# Patient Record
Sex: MALE | Race: WHITE | NOT HISPANIC OR LATINO | Employment: OTHER | URBAN - METROPOLITAN AREA
[De-identification: names, ages, dates, MRNs, and addresses within clinical notes are randomized per-mention and may not be internally consistent; named-entity substitution may affect disease eponyms.]

---

## 2023-07-07 DIAGNOSIS — E79.0 HYPERURICEMIA: Primary | ICD-10-CM

## 2023-07-07 RX ORDER — OLMESARTAN MEDOXOMIL 40 MG/1
1 TABLET ORAL DAILY
COMMUNITY
Start: 2022-04-20 | End: 2023-11-01

## 2023-07-07 RX ORDER — ALLOPURINOL 100 MG/1
1 TABLET ORAL DAILY
COMMUNITY
Start: 2022-04-12 | End: 2023-07-07 | Stop reason: SDUPTHER

## 2023-07-07 RX ORDER — ALBUTEROL SULFATE 90 UG/1
AEROSOL, METERED RESPIRATORY (INHALATION)
COMMUNITY
Start: 2022-07-08 | End: 2023-07-31 | Stop reason: ALTCHOICE

## 2023-07-07 RX ORDER — NAPROXEN 500 MG/1
TABLET ORAL 2 TIMES DAILY
COMMUNITY
Start: 2022-01-30 | End: 2023-07-31 | Stop reason: ALTCHOICE

## 2023-07-07 RX ORDER — ALLOPURINOL 100 MG/1
100 TABLET ORAL DAILY
Qty: 90 TABLET | Refills: 1 | Status: SHIPPED | OUTPATIENT
Start: 2023-07-07 | End: 2024-01-10 | Stop reason: SDUPTHER

## 2023-07-07 RX ORDER — FUROSEMIDE 20 MG/1
1 TABLET ORAL DAILY
COMMUNITY
Start: 2022-07-12 | End: 2024-01-30 | Stop reason: ALTCHOICE

## 2023-07-07 RX ORDER — LOSARTAN POTASSIUM AND HYDROCHLOROTHIAZIDE 12.5; 1 MG/1; MG/1
1 TABLET ORAL DAILY
COMMUNITY
End: 2023-07-31 | Stop reason: ALTCHOICE

## 2023-07-07 RX ORDER — COLCHICINE 0.6 MG/1
TABLET ORAL
COMMUNITY
Start: 2016-07-21 | End: 2024-01-10 | Stop reason: SDUPTHER

## 2023-07-07 RX ORDER — AMOXICILLIN AND CLAVULANATE POTASSIUM 875; 125 MG/1; MG/1
1 TABLET, FILM COATED ORAL EVERY 12 HOURS
COMMUNITY
Start: 2023-05-24 | End: 2023-07-31 | Stop reason: ALTCHOICE

## 2023-07-07 RX ORDER — ASPIRIN 81 MG/1
1 TABLET ORAL DAILY
COMMUNITY
Start: 2018-04-24 | End: 2024-01-30 | Stop reason: ALTCHOICE

## 2023-07-12 LAB
ALANINE AMINOTRANSFERASE (SGPT) (U/L) IN SER/PLAS: 49 U/L (ref 10–52)
ALBUMIN (G/DL) IN SER/PLAS: 4.4 G/DL (ref 3.4–5)
ALKALINE PHOSPHATASE (U/L) IN SER/PLAS: 96 U/L (ref 33–136)
ASPARTATE AMINOTRANSFERASE (SGOT) (U/L) IN SER/PLAS: 32 U/L (ref 9–39)
BILIRUBIN DIRECT (MG/DL) IN SER/PLAS: 0.3 MG/DL (ref 0–0.3)
BILIRUBIN TOTAL (MG/DL) IN SER/PLAS: 2.1 MG/DL (ref 0–1.2)
CREATININE (MG/DL) IN SER/PLAS: 1.11 MG/DL (ref 0.5–1.3)
GFR MALE: 71 ML/MIN/1.73M2
PROTEIN TOTAL: 7.4 G/DL (ref 6.4–8.2)
URATE (MG/DL) IN SER/PLAS: 7.3 MG/DL (ref 4–7.5)

## 2023-07-31 ENCOUNTER — OFFICE VISIT (OUTPATIENT)
Dept: PRIMARY CARE | Facility: CLINIC | Age: 73
End: 2023-07-31
Payer: MEDICARE

## 2023-07-31 VITALS
DIASTOLIC BLOOD PRESSURE: 80 MMHG | HEIGHT: 70 IN | HEART RATE: 71 BPM | SYSTOLIC BLOOD PRESSURE: 130 MMHG | BODY MASS INDEX: 35.36 KG/M2 | TEMPERATURE: 97.3 F | OXYGEN SATURATION: 95 % | WEIGHT: 247 LBS

## 2023-07-31 DIAGNOSIS — I10 PRIMARY HYPERTENSION: ICD-10-CM

## 2023-07-31 DIAGNOSIS — I50.9 CHRONIC CONGESTIVE HEART FAILURE, UNSPECIFIED HEART FAILURE TYPE (MULTI): ICD-10-CM

## 2023-07-31 DIAGNOSIS — Z00.00 ROUTINE GENERAL MEDICAL EXAMINATION AT HEALTH CARE FACILITY: Primary | ICD-10-CM

## 2023-07-31 DIAGNOSIS — Z11.59 NEED FOR HEPATITIS C SCREENING TEST: ICD-10-CM

## 2023-07-31 DIAGNOSIS — I70.213 INTERMITTENT CLAUDICATION OF BOTH LOWER EXTREMITIES DUE TO ATHEROSCLEROSIS (CMS-HCC): ICD-10-CM

## 2023-07-31 DIAGNOSIS — E66.01 CLASS 2 SEVERE OBESITY DUE TO EXCESS CALORIES WITH SERIOUS COMORBIDITY AND BODY MASS INDEX (BMI) OF 35.0 TO 35.9 IN ADULT (MULTI): ICD-10-CM

## 2023-07-31 DIAGNOSIS — Z12.12 SCREENING FOR COLORECTAL CANCER: ICD-10-CM

## 2023-07-31 DIAGNOSIS — Z23 NEED FOR VACCINATION: ICD-10-CM

## 2023-07-31 DIAGNOSIS — Z12.5 SCREENING FOR PROSTATE CANCER: ICD-10-CM

## 2023-07-31 DIAGNOSIS — J61 PULMONARY ASBESTOSIS (MULTI): ICD-10-CM

## 2023-07-31 DIAGNOSIS — Z12.11 SCREENING FOR COLORECTAL CANCER: ICD-10-CM

## 2023-07-31 DIAGNOSIS — E78.2 MIXED HYPERLIPIDEMIA: ICD-10-CM

## 2023-07-31 DIAGNOSIS — L72.3 SEBACEOUS CYST: ICD-10-CM

## 2023-07-31 PROBLEM — R60.0 EDEMA OF BOTH FEET: Status: ACTIVE | Noted: 2023-07-31

## 2023-07-31 PROBLEM — S61.459A DOG BITE, HAND: Status: ACTIVE | Noted: 2023-07-31

## 2023-07-31 PROBLEM — Z95.1 H/O FOUR VESSEL CORONARY ARTERY BYPASS GRAFT: Status: ACTIVE | Noted: 2023-07-31

## 2023-07-31 PROBLEM — L03.114 CELLULITIS OF LEFT UPPER EXTREMITY: Status: ACTIVE | Noted: 2023-07-31

## 2023-07-31 PROBLEM — M10.9 GOUT: Status: ACTIVE | Noted: 2023-07-31

## 2023-07-31 PROBLEM — M71.121 INFECTION OF RIGHT OLECRANON BURSA: Status: ACTIVE | Noted: 2023-07-31

## 2023-07-31 PROBLEM — E66.812 CLASS 2 SEVERE OBESITY DUE TO EXCESS CALORIES WITH SERIOUS COMORBIDITY AND BODY MASS INDEX (BMI) OF 35.0 TO 35.9 IN ADULT: Status: ACTIVE | Noted: 2023-07-31

## 2023-07-31 PROBLEM — M54.50 ACUTE BILATERAL LOW BACK PAIN WITHOUT SCIATICA: Status: ACTIVE | Noted: 2023-07-31

## 2023-07-31 PROBLEM — I35.0 AORTIC STENOSIS: Status: ACTIVE | Noted: 2023-07-31

## 2023-07-31 PROBLEM — M1A.9XX1 CHRONIC TOPHACEOUS GOUT: Status: ACTIVE | Noted: 2023-07-31

## 2023-07-31 PROBLEM — E78.5 HYPERLIPIDEMIA: Status: ACTIVE | Noted: 2023-07-31
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
## 2023-07-31 PROBLEM — M1A.9XX0 CHRONIC GOUT: Status: ACTIVE | Noted: 2023-07-31

## 2023-07-31 PROBLEM — N52.9 ED (ERECTILE DYSFUNCTION): Status: ACTIVE | Noted: 2023-07-31

## 2023-07-31 PROBLEM — I25.10 CORONARY ARTERY DISEASE: Status: ACTIVE | Noted: 2023-07-31

## 2023-07-31 PROBLEM — M75.41 IMPINGEMENT SYNDROME OF RIGHT SHOULDER: Status: ACTIVE | Noted: 2023-07-31

## 2023-07-31 PROBLEM — R06.02 SHORTNESS OF BREATH: Status: ACTIVE | Noted: 2023-07-31

## 2023-07-31 PROBLEM — J06.9 UPPER RESPIRATORY INFECTION, ACUTE: Status: ACTIVE | Noted: 2023-07-31

## 2023-07-31 PROBLEM — Z95.2 S/P AVR: Status: ACTIVE | Noted: 2023-07-31

## 2023-07-31 PROBLEM — M71.122 SEPTIC OLECRANON BURSITIS OF LEFT ELBOW: Status: ACTIVE | Noted: 2023-07-31

## 2023-07-31 PROCEDURE — G0442 ANNUAL ALCOHOL SCREEN 15 MIN: HCPCS | Performed by: FAMILY MEDICINE

## 2023-07-31 PROCEDURE — G0446 INTENS BEHAVE THER CARDIO DX: HCPCS | Performed by: FAMILY MEDICINE

## 2023-07-31 PROCEDURE — 1170F FXNL STATUS ASSESSED: CPT | Performed by: FAMILY MEDICINE

## 2023-07-31 PROCEDURE — 1036F TOBACCO NON-USER: CPT | Performed by: FAMILY MEDICINE

## 2023-07-31 PROCEDURE — G0439 PPPS, SUBSEQ VISIT: HCPCS | Performed by: FAMILY MEDICINE

## 2023-07-31 PROCEDURE — 90677 PCV20 VACCINE IM: CPT | Performed by: FAMILY MEDICINE

## 2023-07-31 PROCEDURE — 99497 ADVNCD CARE PLAN 30 MIN: CPT | Performed by: FAMILY MEDICINE

## 2023-07-31 PROCEDURE — 1160F RVW MEDS BY RX/DR IN RCRD: CPT | Performed by: FAMILY MEDICINE

## 2023-07-31 PROCEDURE — 1157F ADVNC CARE PLAN IN RCRD: CPT | Performed by: FAMILY MEDICINE

## 2023-07-31 PROCEDURE — 99397 PER PM REEVAL EST PAT 65+ YR: CPT | Performed by: FAMILY MEDICINE

## 2023-07-31 PROCEDURE — 3075F SYST BP GE 130 - 139MM HG: CPT | Performed by: FAMILY MEDICINE

## 2023-07-31 PROCEDURE — G0009 ADMIN PNEUMOCOCCAL VACCINE: HCPCS | Performed by: FAMILY MEDICINE

## 2023-07-31 PROCEDURE — 3079F DIAST BP 80-89 MM HG: CPT | Performed by: FAMILY MEDICINE

## 2023-07-31 PROCEDURE — 1126F AMNT PAIN NOTED NONE PRSNT: CPT | Performed by: FAMILY MEDICINE

## 2023-07-31 PROCEDURE — 1159F MED LIST DOCD IN RCRD: CPT | Performed by: FAMILY MEDICINE

## 2023-07-31 PROCEDURE — 3008F BODY MASS INDEX DOCD: CPT | Performed by: FAMILY MEDICINE

## 2023-07-31 ASSESSMENT — LIFESTYLE VARIABLES
HOW OFTEN DO YOU HAVE A DRINK CONTAINING ALCOHOL: NEVER
HOW OFTEN DO YOU HAVE SIX OR MORE DRINKS ON ONE OCCASION: NEVER

## 2023-07-31 ASSESSMENT — PATIENT HEALTH QUESTIONNAIRE - PHQ9
SUM OF ALL RESPONSES TO PHQ9 QUESTIONS 1 & 2: 0
2. FEELING DOWN, DEPRESSED OR HOPELESS: NOT AT ALL
1. LITTLE INTEREST OR PLEASURE IN DOING THINGS: NOT AT ALL
2. FEELING DOWN, DEPRESSED OR HOPELESS: NOT AT ALL
1. LITTLE INTEREST OR PLEASURE IN DOING THINGS: NOT AT ALL
SUM OF ALL RESPONSES TO PHQ9 QUESTIONS 1 AND 2: 0

## 2023-07-31 ASSESSMENT — ENCOUNTER SYMPTOMS
OCCASIONAL FEELINGS OF UNSTEADINESS: 0
DEPRESSION: 0
LOSS OF SENSATION IN FEET: 0

## 2023-07-31 ASSESSMENT — ACTIVITIES OF DAILY LIVING (ADL)
DOING_HOUSEWORK: INDEPENDENT
GROCERY_SHOPPING: INDEPENDENT
MANAGING_FINANCES: INDEPENDENT
TAKING_MEDICATION: INDEPENDENT
BATHING: INDEPENDENT
DRESSING: INDEPENDENT

## 2023-07-31 ASSESSMENT — PAIN SCALES - GENERAL: PAINLEVEL: 0-NO PAIN

## 2023-07-31 NOTE — PROGRESS NOTES
Subjective   Reason for Visit: Mynor Blake is an 72 y.o. male here for a Medicare Wellness visit.     Past Medical, Surgical, and Family History reviewed and updated in chart.    Reviewed all medications by prescribing practitioner or clinical pharmacist (such as prescriptions, OTCs, herbal therapies and supplements) and documented in the medical record.    HPI  72-year-old male for Medicare wellness visit and complete physical exam.  Patient would like a plastic surgery referral for removal of a sebaceous cyst on his forehead.  He is active with cardiology.  He is due for colon cancer screening.  Patient Care Team:  Pancho FRANKS DO as PCP - General  Pancho FRANKS DO as PCP - [employer] PCP     Review of Systems  Constitutional: no chills, no fever and no night sweats.   Eyes: no blurred vision and no eyesight problems.   ENT: no hearing loss, no nasal congestion, no nasal discharge, no hoarseness and no sore throat.   Cardiovascular: no chest pain, no intermittent leg claudication, no lower extremity edema, no palpitations and no syncope.   Respiratory: no cough, no shortness of breath during exertion, no shortness of breath at rest and no wheezing.   Gastrointestinal: no abdominal pain, no blood in stools, no constipation, no diarrhea, no melena, no nausea, no rectal pain and no vomiting.   Genitourinary: no dysuria, no change in urinary frequency, no urinary hesitancy and no feelings of urinary urgency.   Neurological: no difficulty walking, no headache, no limb weakness, no numbness and no tingling.   Psychiatric: no anxiety, no depression, no anhedonia and no substance use disorders.   Endocrine: no recent weight gain and no recent weight loss.   Hematologic/Lymphatic: no tendency for easy bruising and no swollen glands.  Objective   Vitals:  /80 (BP Location: Left arm, Patient Position: Sitting, BP Cuff Size: Adult)   Pulse 71   Temp 36.3 °C (97.3 °F) (Temporal)   Ht  "1.778 m (5' 10\")   Wt 112 kg (247 lb)   SpO2 95%   BMI 35.44 kg/m²       Physical Exam  Constitutional: Alert and in no acute distress. Well developed, well nourished.   Eyes: Pupils were equal in size, round, reactive to light (PERRL) with normal accommodation and extraocular movements intact (EOMI). Ophthalmoscopic examination: Normal.   Ears, Nose, Mouth, and Throat: External inspection of ears and nose: Normal. Otoscopic examination: Normal. Lips, teeth, and gums: Normal. Oropharynx: Normal.   Neck: No neck mass was observed. Supple. Thyroid not enlarged and there were no palpable thyroid nodules.   Cardiovascular: Heart rate and rhythm were normal, normal S1 and S2, no gallops, no murmurs and no pericardial rub. Carotid pulses: Normal with no bruits. Abdominal aorta: Normal. Pedal pulses: Normal. No peripheral edema.   Pulmonary: No respiratory distress. Clear bilateral breath sounds.   Abdomen: Soft nontender; no abdominal mass palpated. Normal bowel sounds. No organomegaly.   Skin: Normal skin color and pigmentation, normal skin turgor, and no rash.   Psychiatric: Judgment and insight: Intact. Mood and affect: Normal.  Assessment/Plan   Problem List Items Addressed This Visit       Hyperlipidemia    Hypertension    Relevant Orders    Lipid Panel    Comprehensive Metabolic Panel    CBC and Auto Differential    Urinalysis with Reflex Microscopic    Intermittent claudication of both lower extremities due to atherosclerosis (CMS/HCC)    Pulmonary asbestosis (CMS/HCC)    Need for vaccination    Relevant Orders    Pneumococcal conjugate vaccine 20-valent IM (Completed)    Need for hepatitis C screening test    Relevant Orders    Hepatitis C antibody    Screening for colorectal cancer    Relevant Orders    Cologuard® colon cancer screening    Screening for prostate cancer    Relevant Orders    Prostate Specific Antigen, Screen    Routine general medical examination at health care facility - Primary    Class 2 " severe obesity due to excess calories with serious comorbidity and body mass index (BMI) of 35.0 to 35.9 in adult (CMS/[facility])    Sebaceous cyst    Relevant Orders    Referral to Plastic Surgery    Chronic congestive heart failure, unspecified heart failure type (CMS/[facility])     #1.  Stable physical exam.  2.  We will call fasting blood work results.  3.  We discussed class II severe obesity with a BMI above 35 and a serious comorbidity of hypertension.  We discussed exercise weight loss.  Calorie count for weight loss.  Recheck BMI in 6 months.  4.  I spent 15 minutes screening for alcohol use.  5.  I spent 15 minutes face-to-face with this individual discussing the cardiovascular risk and behavioral therapies of nutritional choices, exercise, and elimination of habits contributing to risk. We agreed on a plan how they may be able to reduce their current cardiovascular risk. Aspirin use was discussed and encouraged. Patient's 10 year cardiovascular risk estimate calculates at  6.  Congestive heart failure stable.  Continue current medications.  Follow-up in 6 months.  7.  Intermittent claudication is currently stable.  Not worsening.  Follow-up in 6 months.  8.  Pulmonary asbestosis is stable.  Monitor lung function.  Follow-up in 6 months.

## 2023-07-31 NOTE — ACP (ADVANCE CARE PLANNING)
Advance Care Planning Note     Discussion Date: 07/31/23   Discussion Participants: patient    The patient wishes to discuss Advance Care Planning today and the following is a brief summary of our discussion.     Patient has capacity to make their own medical decisions: Yes  Health Care Agent/Surrogate Decision Maker documented in chart: Yes    Documents on file and valid:  Advance Directive/Living Will: Yes   Health Care Power of : Yes  Other:     Communication of Medical Status/Prognosis:   Medical status is good.  Prognosis is good    Communication of Treatment Goals/Options:   Treatment goals discussed    Treatment Decisions  Goals of Care: quality of life is prioritized; willing to accept low-burden treatments to achieve meaningful goals     Follow Up Plan    Team Members    Time Statement: Total face to face time spent on advance care planning was 16 minutes with 16 minutes spent in counseling, including the explanation.    Pancho Gay DO  7/31/2023 11:35 AM

## 2023-08-02 ENCOUNTER — TELEPHONE (OUTPATIENT)
Dept: PRIMARY CARE | Facility: CLINIC | Age: 73
End: 2023-08-02
Payer: MEDICARE

## 2023-08-02 NOTE — TELEPHONE ENCOUNTER
Pt called and states that his [facility] Pt states that mana contacted him and said that he needs a prior authorization.

## 2023-10-31 DIAGNOSIS — I10 PRIMARY HYPERTENSION: Primary | ICD-10-CM

## 2023-10-31 DIAGNOSIS — M1A.9XX1 CHRONIC GOUT, UNSPECIFIED, WITH TOPHUS (TOPHI): ICD-10-CM

## 2023-10-31 RX ORDER — ALLOPURINOL 300 MG/1
300 TABLET ORAL DAILY
Qty: 90 TABLET | Refills: 0 | Status: SHIPPED | OUTPATIENT
Start: 2023-10-31 | End: 2024-04-04

## 2023-11-01 RX ORDER — OLMESARTAN MEDOXOMIL 40 MG/1
40 TABLET ORAL DAILY
Qty: 90 TABLET | Refills: 3 | Status: SHIPPED | OUTPATIENT
Start: 2023-11-01

## 2024-01-10 ENCOUNTER — OFFICE VISIT (OUTPATIENT)
Dept: RHEUMATOLOGY | Facility: CLINIC | Age: 74
End: 2024-01-10
Payer: MEDICARE

## 2024-01-10 VITALS — BODY MASS INDEX: 35.65 KG/M2 | WEIGHT: 249 LBS | HEIGHT: 70 IN

## 2024-01-10 DIAGNOSIS — E79.0 HYPERURICEMIA: ICD-10-CM

## 2024-01-10 DIAGNOSIS — M1A.9XX1 CHRONIC TOPHACEOUS GOUT: Primary | ICD-10-CM

## 2024-01-10 PROCEDURE — 1036F TOBACCO NON-USER: CPT | Performed by: INTERNAL MEDICINE

## 2024-01-10 PROCEDURE — 1160F RVW MEDS BY RX/DR IN RCRD: CPT | Performed by: INTERNAL MEDICINE

## 2024-01-10 PROCEDURE — 1158F ADVNC CARE PLAN TLK DOCD: CPT | Performed by: INTERNAL MEDICINE

## 2024-01-10 PROCEDURE — 1159F MED LIST DOCD IN RCRD: CPT | Performed by: INTERNAL MEDICINE

## 2024-01-10 PROCEDURE — 1126F AMNT PAIN NOTED NONE PRSNT: CPT | Performed by: INTERNAL MEDICINE

## 2024-01-10 PROCEDURE — 99213 OFFICE O/P EST LOW 20 MIN: CPT | Performed by: INTERNAL MEDICINE

## 2024-01-10 PROCEDURE — 3008F BODY MASS INDEX DOCD: CPT | Performed by: INTERNAL MEDICINE

## 2024-01-10 RX ORDER — LOSARTAN POTASSIUM 100 MG/1
100 TABLET ORAL
COMMUNITY

## 2024-01-10 RX ORDER — COLCHICINE 0.6 MG/1
0.6 TABLET ORAL EVERY OTHER DAY
Qty: 45 TABLET | Refills: 1 | Status: SHIPPED | OUTPATIENT
Start: 2024-01-10 | End: 2024-04-09

## 2024-01-10 RX ORDER — ALLOPURINOL 100 MG/1
200 TABLET ORAL DAILY
Qty: 180 TABLET | Refills: 1 | Status: SHIPPED | OUTPATIENT
Start: 2024-01-10 | End: 2024-04-09

## 2024-01-10 RX ORDER — ACETAMINOPHEN 500 MG
500-1000 TABLET ORAL EVERY 6 HOURS PRN
COMMUNITY
Start: 2022-02-06

## 2024-01-10 NOTE — PROGRESS NOTES
Follow-up Rheumatology Patient Visit    Chief Complaint:  Mynor Blake is a 73 y.o. male presenting today for Follow-up.    History of Presenting Problem:   72 y/o male with CAD s/p CABG Hx of Gout arthropathy on Allopurinol present for evaluation. 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 He does report a Hx of gout flares mainly in the elbows and occasionally in his feet. His last Gout flare was December 2021.  His PCP recently got rid of HCTZ and it seem to have helped the Gout controlled.   He denies any recent Gout attacks. He still pain in his right hand, and has a nodule on the mid MCP joint that is painful when he bumps it. Still with nodule in the left elbow     Problem List:   Patient Active Problem List   Diagnosis    Acute bilateral low back pain without sciatica    Aortic stenosis    Cellulitis of left upper extremity    Coronary artery disease    Dog bite, hand    ED (erectile dysfunction)    Edema of both feet    Chronic gout    Chronic tophaceous gout    Gout    H/O four vessel coronary artery bypass graft    S/P AVR    Hyperlipidemia    Hypertension    Impingement syndrome of right shoulder    Intermittent claudication of both lower extremities due to atherosclerosis (CMS/HCC)    Pulmonary asbestosis (CMS/HCC)    Infection of right olecranon bursa    Septic olecranon bursitis of left elbow    Shortness of breath    Upper respiratory infection, acute    Need for vaccination    Need for hepatitis C screening test    Screening for colorectal cancer    Screening for prostate cancer    Routine general medical examination at health care facility    Class 2 severe obesity due to excess calories with serious comorbidity and body mass index (BMI) of 35.0 to 35.9 in adult (CMS/HCC)    Sebaceous cyst    Chronic congestive heart failure, unspecified heart failure type (CMS/HCC)       Past Medical History:   Past Medical History:   Diagnosis Date     Atherosclerosis of native arteries of extremities with intermittent claudication, right leg (CMS/HCC)     Atherosclerosis of native arteries of extremities with intermittent claudication, right leg    Morbid (severe) obesity due to excess calories (CMS/[facility]) 12/15/2021    Class 2 severe obesity due to excess calories with serious comorbidity and body mass index (BMI) of 35.0 to 35.9 in adult    Nonrheumatic aortic (valve) stenosis     Severe aortic stenosis    Other hyperlipidemia     Other hyperlipidemia    Personal history of other diseases of the circulatory system     History of coronary artery disease    Personal history of other diseases of the circulatory system     History of claudication    Personal history of other diseases of the musculoskeletal system and connective tissue 03/26/2018    History of gout    Personal history of other specified conditions     History of shortness of breath    Presence of aortocoronary bypass graft     S/P CABG x 3    Pyothorax without fistula (CMS/[facility]) 07/11/2018    Abscess of chest       Surgical History:   Past Surgical History:   Procedure Laterality Date    CHOLECYSTECTOMY  03/26/2018    Cholecystectomy    CORONARY ARTERY BYPASS GRAFT  04/30/2018    CABG    OTHER SURGICAL HISTORY  04/12/2022    Cataract surgery    OTHER SURGICAL HISTORY  04/12/2022    Bypass        Allergies: No Known Allergies    Medications:   Current Outpatient Medications:     acetaminophen (Tylenol) 500 mg tablet, Take 1-2 tablets (500-1,000 mg) by mouth every 6 hours if needed., Disp: , Rfl:     allopurinol (Zyloprim) 300 mg tablet, TAKE 1 TABLET BY MOUTH EVERY DAY AS DIRECTED, Disp: 90 tablet, Rfl: 0    aspirin 81 mg EC tablet, Take 1 tablet (81 mg) by mouth once daily., Disp: , Rfl:     furosemide (Lasix) 20 mg tablet, Take 1 tablet (20 mg) by mouth once daily., Disp: , Rfl:     losartan (Cozaar) 100 mg tablet, Take 1 tablet (100 mg) by mouth once daily., Disp: , Rfl:     olmesartan (BENIcar) 40  "mg tablet, TAKE 1 TABLET BY MOUTH EVERY DAY, Disp: 90 tablet, Rfl: 3    rosuvastatin (Crestor) 40 mg tablet, TAKE 1 TABLET BY MOUTH EVERY DAY, Disp: 90 tablet, Rfl: 3    allopurinol (Zyloprim) 100 mg tablet, Take 2 tablets (200 mg) by mouth once daily., Disp: 180 tablet, Rfl: 1    colchicine 0.6 mg tablet, Take 1 tablet (0.6 mg) by mouth every other day., Disp: 45 tablet, Rfl: 1      Objective   Physical Examination:    Visit Vitals  Ht 1.778 m (5' 10\")   Wt 113 kg (249 lb)   BMI 35.73 kg/m²   Smoking Status Never   BSA 2.36 m²        Gen: NAD, A&O x 3  HEENT: clear sclera and conjunctiva,     Musculoskeletal:   Neck; WNL, full ROM  Shoulder: WNL, full ROM  Elbow:WNL, full ROM, no effusion noted  Wrist and fingers;no active synovitis noted, Full ROM in the Wrist , Good fist and   Knees:  No effusions or crepitation, full ROM.  Hips; WNL, full ROM, Negative Juaquin test  Ankle, Feet; WNL, full ROM    Skin: No rashes or lesions seen, no nail changes  Neuro: A&O x3, Normal Gait    Procedures :None    Orders:  Orders Placed This Encounter   Procedures    Uric Acid    Comprehensive Metabolic Panel        Provider Impression:   Assessment/Plan   Encounter Diagnoses   Name Primary?    Chronic tophaceous gout Yes    Hyperuricemia         72 y/o male with CAD s/p CABG Hx of Gout arthropathy on Allopurinol present for evaluation. 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 He has not reported an new gout attack since regimen adjustment  -Will increase  allopurinol to 500 mg given uric acid is not at goal. Did discuss Krystexxa as an option if he continues to have frequent flares but patient not interested at this time.  We also discussed possible joint injection but patient is reluctant to do this as well.  -Continue colchicine every other day  -Check uric acid, hepatic panel and creatinine   -Follow-up in 6 months   "

## 2024-01-28 ENCOUNTER — HOSPITAL ENCOUNTER (EMERGENCY)
Facility: HOSPITAL | Age: 74
Discharge: HOME | End: 2024-01-28
Payer: MEDICARE

## 2024-01-28 ENCOUNTER — APPOINTMENT (OUTPATIENT)
Dept: RADIOLOGY | Facility: HOSPITAL | Age: 74
End: 2024-01-28
Payer: MEDICARE

## 2024-01-28 VITALS
HEIGHT: 68 IN | SYSTOLIC BLOOD PRESSURE: 161 MMHG | WEIGHT: 230 LBS | HEART RATE: 78 BPM | TEMPERATURE: 97.9 F | RESPIRATION RATE: 18 BRPM | BODY MASS INDEX: 34.86 KG/M2 | OXYGEN SATURATION: 98 % | DIASTOLIC BLOOD PRESSURE: 74 MMHG

## 2024-01-28 DIAGNOSIS — M54.50 ACUTE RIGHT-SIDED LOW BACK PAIN WITHOUT SCIATICA: Primary | ICD-10-CM

## 2024-01-28 PROCEDURE — 2500000004 HC RX 250 GENERAL PHARMACY W/ HCPCS (ALT 636 FOR OP/ED): Performed by: NURSE PRACTITIONER

## 2024-01-28 PROCEDURE — 72100 X-RAY EXAM L-S SPINE 2/3 VWS: CPT | Mod: FOREIGN READ | Performed by: RADIOLOGY

## 2024-01-28 PROCEDURE — 2500000001 HC RX 250 WO HCPCS SELF ADMINISTERED DRUGS (ALT 637 FOR MEDICARE OP): Performed by: NURSE PRACTITIONER

## 2024-01-28 PROCEDURE — 99284 EMERGENCY DEPT VISIT MOD MDM: CPT

## 2024-01-28 PROCEDURE — 99283 EMERGENCY DEPT VISIT LOW MDM: CPT

## 2024-01-28 PROCEDURE — 96372 THER/PROPH/DIAG INJ SC/IM: CPT

## 2024-01-28 PROCEDURE — 72100 X-RAY EXAM L-S SPINE 2/3 VWS: CPT

## 2024-01-28 RX ORDER — OXYCODONE AND ACETAMINOPHEN 5; 325 MG/1; MG/1
1 TABLET ORAL ONCE
Status: COMPLETED | OUTPATIENT
Start: 2024-01-28 | End: 2024-01-28

## 2024-01-28 RX ORDER — TIZANIDINE 4 MG/1
4 TABLET ORAL EVERY 6 HOURS PRN
Qty: 20 TABLET | Refills: 0 | Status: SHIPPED | OUTPATIENT
Start: 2024-01-28 | End: 2024-02-02

## 2024-01-28 RX ORDER — KETOROLAC TROMETHAMINE 30 MG/ML
15 INJECTION, SOLUTION INTRAMUSCULAR; INTRAVENOUS ONCE
Status: COMPLETED | OUTPATIENT
Start: 2024-01-28 | End: 2024-01-28

## 2024-01-28 RX ORDER — DICLOFENAC SODIUM 75 MG/1
75 TABLET, DELAYED RELEASE ORAL 2 TIMES DAILY PRN
Qty: 10 TABLET | Refills: 0 | Status: SHIPPED | OUTPATIENT
Start: 2024-01-28 | End: 2024-02-02

## 2024-01-28 RX ADMIN — KETOROLAC TROMETHAMINE 15 MG: 30 INJECTION, SOLUTION INTRAMUSCULAR at 17:36

## 2024-01-28 RX ADMIN — OXYCODONE HYDROCHLORIDE AND ACETAMINOPHEN 1 TABLET: 5; 325 TABLET ORAL at 17:36

## 2024-01-28 ASSESSMENT — COLUMBIA-SUICIDE SEVERITY RATING SCALE - C-SSRS
2. HAVE YOU ACTUALLY HAD ANY THOUGHTS OF KILLING YOURSELF?: NO
6. HAVE YOU EVER DONE ANYTHING, STARTED TO DO ANYTHING, OR PREPARED TO DO ANYTHING TO END YOUR LIFE?: NO
1. IN THE PAST MONTH, HAVE YOU WISHED YOU WERE DEAD OR WISHED YOU COULD GO TO SLEEP AND NOT WAKE UP?: NO

## 2024-01-28 ASSESSMENT — PAIN SCALES - GENERAL: PAINLEVEL_OUTOF10: 5 - MODERATE PAIN

## 2024-01-28 ASSESSMENT — LIFESTYLE VARIABLES
EVER HAD A DRINK FIRST THING IN THE MORNING TO STEADY YOUR NERVES TO GET RID OF A HANGOVER: NO
EVER FELT BAD OR GUILTY ABOUT YOUR DRINKING: NO
REASON UNABLE TO ASSESS: NO
HAVE PEOPLE ANNOYED YOU BY CRITICIZING YOUR DRINKING: NO
HAVE YOU EVER FELT YOU SHOULD CUT DOWN ON YOUR DRINKING: NO

## 2024-01-28 ASSESSMENT — PAIN DESCRIPTION - PROGRESSION: CLINICAL_PROGRESSION: GRADUALLY IMPROVING

## 2024-01-28 ASSESSMENT — PAIN - FUNCTIONAL ASSESSMENT: PAIN_FUNCTIONAL_ASSESSMENT: 0-10

## 2024-01-28 NOTE — ED TRIAGE NOTES
Pt states he has lower back pain that started last Thursday. Pt unsure if he injured it but was hunched over doing yard clean up previous to this. Pt states it hasn't gotten worse and medications have not helped. Pt has tried lidocaine patches and  tramadol.

## 2024-01-28 NOTE — ED PROVIDER NOTES
HPI   Chief Complaint   Patient presents with    Back Pain     Pt states he has lower back pain that started last Thursday. Pt unsure if he injured it but was hunched over doing yard clean up previous to this. Pt states it hasn't gotten worse and medications have not helped. Pt has tried lidocaine patches and  tramadol.       Patient is a 73-year-old male with past medical history of asbestosis, Intermittent claudication of the right leg, morbid obesity, CABG x 3, gout, hyperlipidemia, aortic stenosis, who presents ED today due to low back pain.  Patient states that started on Thursday and has been trying OTC medications and tramadol without relief.  Patient states it started after he did a round of cleaning up his yard after the snow melted.  Patient denies any fever, or unexplained weight loss, any bowel or bladder dysfunction, cancer, lower extremity weakness or numbness, saddle anesthesia, inability to ambulate, history of diabetes, history of IV drug use, or autoimmune suppression.                                    Sara Coma Scale Score: 15                  Patient History   Past Medical History:   Diagnosis Date    Atherosclerosis of native arteries of extremities with intermittent claudication, right leg (CMS/HCC)     Atherosclerosis of native arteries of extremities with intermittent claudication, right leg    Morbid (severe) obesity due to excess calories (CMS/HCC) 12/15/2021    Class 2 severe obesity due to excess calories with serious comorbidity and body mass index (BMI) of 35.0 to 35.9 in adult    Nonrheumatic aortic (valve) stenosis     Severe aortic stenosis    Other hyperlipidemia     Other hyperlipidemia    Personal history of other diseases of the circulatory system     History of coronary artery disease    Personal history of other diseases of the circulatory system     History of claudication    Personal history of other diseases of the musculoskeletal system and connective tissue 03/26/2018     History of gout    Personal history of other specified conditions     History of shortness of breath    Presence of aortocoronary bypass graft     S/P CABG x 3    Pyothorax without fistula (CMS/HCC) 07/11/2018    Abscess of chest     Past Surgical History:   Procedure Laterality Date    CHOLECYSTECTOMY  03/26/2018    Cholecystectomy    CORONARY ARTERY BYPASS GRAFT  04/30/2018    CABG    OTHER SURGICAL HISTORY  04/12/2022    Cataract surgery    OTHER SURGICAL HISTORY  04/12/2022    Bypass     Family History   Problem Relation Name Age of Onset    Heart defect Mother      Diabetes Father       Social History     Tobacco Use    Smoking status: Never    Smokeless tobacco: Never   Substance Use Topics    Alcohol use: Never    Drug use: Never       Physical Exam   ED Triage Vitals [01/28/24 1716]   Temperature Heart Rate Respirations BP   36.6 °C (97.9 °F) 81 (!) 22 168/87      Pulse Ox Temp src Heart Rate Source Patient Position   96 % -- -- --      BP Location FiO2 (%)     -- --       Physical Exam  Vitals and nursing note reviewed.   Constitutional:       General: He is not in acute distress.     Appearance: He is well-developed.   HENT:      Head: Normocephalic and atraumatic.   Eyes:      Conjunctiva/sclera: Conjunctivae normal.   Cardiovascular:      Rate and Rhythm: Normal rate and regular rhythm.      Heart sounds: No murmur heard.  Pulmonary:      Effort: Pulmonary effort is normal. No respiratory distress.      Breath sounds: Normal breath sounds.   Abdominal:      Palpations: Abdomen is soft.      Tenderness: There is no abdominal tenderness.   Musculoskeletal:         General: No swelling.      Cervical back: Neck supple.      Thoracic back: Normal.      Lumbar back: Tenderness (Right paraspinal plain of the lower lumbar region.) present. No spasms. Negative right straight leg raise test and negative left straight leg raise test.      Right foot: Normal capillary refill. Normal pulse.      Left foot: Normal  capillary refill. Normal pulse.      Comments: Lumbar pain:  L2 adduct thigh, normal  L3 extend knee, normal  L4 dorsiflex ankle, normal  L5 point great toe, normal  L2-L3-L4 knee reflex, 2+  S1 flex knee, normal  S2 plantarflex toes, normal  S3-S4-S5 groin, perianal sensation, Normal Per patient   Skin:     General: Skin is warm and dry.      Capillary Refill: Capillary refill takes less than 2 seconds.   Neurological:      Mental Status: He is alert.   Psychiatric:         Mood and Affect: Mood normal.         ED Course & MDM   ED Course as of 01/28/24 2018   Sun Jan 28, 2024 2016 XR lumbar spine 2-3 views  Mild anterolisthesis L5 on S1 possible, pars defect.  Moderate lower lumbar facet arthrosis.   [WS]      ED Course User Index  [WS] CARLYN Johnson-CNP         Diagnoses as of 01/28/24 2018   Acute right-sided low back pain without sciatica       Medical Decision Making  Differentials considered, cauda equina, epidural abscess, cord compression, musculoskeletal pain, kidney stones and pyelonephritis.  Given patient's history, physical exam, and risk factors as well as having no neurologic symptoms, weakness,   Bowel or bladder retention/incontinence it is unlikely that the cause of patient's complaints are related to cauda equina, cord compression, or epidural abscess.  Patient has no urinary symptoms concerning for kidney stone or pyelonephritis.  Patient was given IM Toradol and oral Percocet with moderate relief of pain.  Patient's respiratory rate listed as 22 on triage, on my count he is only at 14.  Patient's x-ray imaging does not reveal any acute fracture or subluxation, does have significant degenerative changes especially in L5/S1 region.    I discussed the differential, results and discharge plan with the patient.  I emphasized the importance of follow-up with the physician I referred them to in the timeframe recommended.  I explained reasons for the them to return to the Emergency  Department. Additional verbal discharge instructions were also given and discussed with them to supplement those generated by the EMR. We also discussed medications that were prescribed (if any) including common side effects and interactions. All questions were addressed.  They understand return precautions and discharge instructions. They expressed understanding.      Amount and/or Complexity of Data Reviewed  Radiology: ordered and independent interpretation performed.    Risk  Prescription drug management.        Procedure  Procedures     CARLYN Johnson-BECCA  01/28/24 2023

## 2024-01-30 ENCOUNTER — OFFICE VISIT (OUTPATIENT)
Dept: PRIMARY CARE | Facility: CLINIC | Age: 74
End: 2024-01-30
Payer: MEDICARE

## 2024-01-30 VITALS
TEMPERATURE: 96.6 F | BODY MASS INDEX: 38.62 KG/M2 | WEIGHT: 254.8 LBS | DIASTOLIC BLOOD PRESSURE: 82 MMHG | HEART RATE: 73 BPM | OXYGEN SATURATION: 98 % | HEIGHT: 68 IN | SYSTOLIC BLOOD PRESSURE: 120 MMHG

## 2024-01-30 DIAGNOSIS — I10 PRIMARY HYPERTENSION: ICD-10-CM

## 2024-01-30 DIAGNOSIS — M54.41 ACUTE RIGHT-SIDED LOW BACK PAIN WITH RIGHT-SIDED SCIATICA: Primary | ICD-10-CM

## 2024-01-30 PROCEDURE — 1157F ADVNC CARE PLAN IN RCRD: CPT | Performed by: FAMILY MEDICINE

## 2024-01-30 PROCEDURE — 3079F DIAST BP 80-89 MM HG: CPT | Performed by: FAMILY MEDICINE

## 2024-01-30 PROCEDURE — 3074F SYST BP LT 130 MM HG: CPT | Performed by: FAMILY MEDICINE

## 2024-01-30 PROCEDURE — 1036F TOBACCO NON-USER: CPT | Performed by: FAMILY MEDICINE

## 2024-01-30 PROCEDURE — 1125F AMNT PAIN NOTED PAIN PRSNT: CPT | Performed by: FAMILY MEDICINE

## 2024-01-30 PROCEDURE — 99214 OFFICE O/P EST MOD 30 MIN: CPT | Performed by: FAMILY MEDICINE

## 2024-01-30 PROCEDURE — 3008F BODY MASS INDEX DOCD: CPT | Performed by: FAMILY MEDICINE

## 2024-01-30 PROCEDURE — 1159F MED LIST DOCD IN RCRD: CPT | Performed by: FAMILY MEDICINE

## 2024-01-30 RX ORDER — METHYLPREDNISOLONE 4 MG/1
TABLET ORAL
Qty: 21 TABLET | Refills: 0 | Status: SHIPPED | OUTPATIENT
Start: 2024-01-30 | End: 2024-02-06

## 2024-01-30 ASSESSMENT — PAIN SCALES - GENERAL: PAINLEVEL: 4

## 2024-01-30 ASSESSMENT — ENCOUNTER SYMPTOMS
MYALGIAS: 1
BACK PAIN: 1

## 2024-01-30 NOTE — PROGRESS NOTES
Subjective   Patient ID: Mynor Blake is a 73 y.o. male who presents for Back Pain (Patient is here for back pain).  HPI  73-year-old male with a history of hypertension presents for urgent care follow-up for acute right-sided low back pain with radiation down the right leg.  Still having pain and discomfort.  No weakness.  No loss of bowel or bladder function.  Symptoms are constant.  Symptoms are getting worse.  Home blood pressures been stable since.  Review of Systems   Musculoskeletal:  Positive for back pain and myalgias.   Skin:  Negative for rash.   All other systems reviewed and are negative.      Visit Vitals  /82 (BP Location: Right arm, Patient Position: Sitting, BP Cuff Size: Adult long)   Pulse 73   Temp 35.9 °C (96.6 °F) (Temporal)        Objective   Physical Exam  Vitals reviewed.   Constitutional:       Appearance: Normal appearance.   Cardiovascular:      Rate and Rhythm: Normal rate and regular rhythm.      Heart sounds: Normal heart sounds.   Pulmonary:      Breath sounds: Normal breath sounds.   Musculoskeletal:         General: Tenderness present. No swelling or signs of injury.   Skin:     Findings: No rash.   Neurological:      Mental Status: He is alert.      Sensory: No sensory deficit.      Motor: No weakness.      Coordination: Coordination normal.      Gait: Gait normal.      Deep Tendon Reflexes: Reflexes normal.   Psychiatric:         Mood and Affect: Mood normal.         Behavior: Behavior normal.         Assessment/Plan   Problem List Items Addressed This Visit             ICD-10-CM    Hypertension I10    Acute right-sided low back pain with right-sided sciatica - Primary M54.41    Relevant Medications    methylPREDNISolone (Medrol Dospak) 4 mg tablets     #1.  Alternate ice and heat.  Medrol Dosepak.  Muscle laxer at bedtime.  Follow-up if no improvement in 7 to 10 days.  2.  Blood pressure stable.

## 2024-04-03 DIAGNOSIS — M1A.9XX1 CHRONIC GOUT, UNSPECIFIED, WITH TOPHUS (TOPHI): ICD-10-CM

## 2024-04-04 RX ORDER — ALLOPURINOL 300 MG/1
300 TABLET ORAL DAILY
Qty: 90 TABLET | Refills: 0 | Status: SHIPPED | OUTPATIENT
Start: 2024-04-04

## 2024-06-20 PROBLEM — Z96.1 S/P CATARACT EXTRACTION AND INSERTION OF INTRAOCULAR LENS: Status: ACTIVE | Noted: 2017-03-09

## 2024-06-20 PROBLEM — H35.419 LATTICE DEGENERATION OF RETINA: Status: ACTIVE | Noted: 2017-02-21
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
## 2024-06-20 PROBLEM — H02.833 DERMATOCHALASIS OF BOTH EYELIDS: Status: ACTIVE | Noted: 2017-02-21

## 2024-06-20 PROBLEM — N17.9 AKI (ACUTE KIDNEY INJURY) (CMS-HCC): Status: ACTIVE | Noted: 2021-04-25

## 2024-06-20 PROBLEM — M75.40 IMPINGEMENT SYNDROME OF SHOULDER REGION: Status: ACTIVE | Noted: 2023-01-12

## 2024-06-20 PROBLEM — M25.529 ELBOW PAIN: Status: ACTIVE | Noted: 2021-04-25

## 2024-06-20 PROBLEM — H25.813 COMBINED FORMS OF AGE-RELATED CATARACT OF BOTH EYES: Status: ACTIVE | Noted: 2017-02-21

## 2024-06-20 PROBLEM — H02.836 DERMATOCHALASIS OF BOTH EYELIDS: Status: ACTIVE | Noted: 2017-02-21

## 2024-06-20 PROBLEM — R50.9 FEVER: Status: ACTIVE | Noted: 2021-04-25

## 2024-06-20 PROBLEM — M10.9 ACUTE GOUT OF RIGHT ANKLE: Status: ACTIVE | Noted: 2024-06-20

## 2024-06-20 PROBLEM — Z98.890 HISTORY OF REPAIR OF RETINAL TEAR BY LASER PHOTOCOAGULATION: Status: ACTIVE | Noted: 2017-02-21

## 2024-06-20 PROBLEM — H52.13 MYOPIA WITH PRESBYOPIA OF BOTH EYES: Status: ACTIVE | Noted: 2017-02-21

## 2024-06-20 PROBLEM — H52.4 MYOPIA WITH PRESBYOPIA OF BOTH EYES: Status: ACTIVE | Noted: 2017-02-21

## 2024-06-20 PROBLEM — Z98.49 S/P CATARACT EXTRACTION AND INSERTION OF INTRAOCULAR LENS: Status: ACTIVE | Noted: 2017-03-09

## 2024-06-20 PROBLEM — H52.209 ASTIGMATISM: Status: ACTIVE | Noted: 2017-02-21

## 2024-06-20 PROBLEM — Z86.79 HISTORY OF CLAUDICATION: Status: ACTIVE | Noted: 2024-06-20

## 2024-06-20 PROBLEM — H43.811 PVD (POSTERIOR VITREOUS DETACHMENT), RIGHT EYE: Status: ACTIVE | Noted: 2017-02-21

## 2024-06-20 PROBLEM — M1A.09X0 CHRONIC GOUT OF MULTIPLE SITES: Status: ACTIVE | Noted: 2017-03-03

## 2024-06-30 DIAGNOSIS — M1A.9XX1 CHRONIC GOUT, UNSPECIFIED, WITH TOPHUS (TOPHI): ICD-10-CM

## 2024-07-01 DIAGNOSIS — M1A.9XX1 CHRONIC GOUT, UNSPECIFIED, WITH TOPHUS (TOPHI): ICD-10-CM

## 2024-07-01 RX ORDER — ALLOPURINOL 100 MG/1
100 TABLET ORAL DAILY
Qty: 90 TABLET | Refills: 0 | Status: SHIPPED | OUTPATIENT
Start: 2024-07-01 | End: 2024-12-28

## 2024-07-01 RX ORDER — ALLOPURINOL 300 MG/1
300 TABLET ORAL DAILY
Qty: 90 TABLET | Refills: 0 | Status: SHIPPED | OUTPATIENT
Start: 2024-07-01

## 2024-07-01 RX ORDER — ALLOPURINOL 300 MG/1
300 TABLET ORAL DAILY
Qty: 90 TABLET | Refills: 0 | OUTPATIENT
Start: 2024-07-01

## 2024-07-10 ENCOUNTER — APPOINTMENT (OUTPATIENT)
Dept: RHEUMATOLOGY | Facility: CLINIC | Age: 74
End: 2024-07-10
Payer: MEDICARE

## 2024-07-25 PROBLEM — Z86.79 HISTORY OF CLAUDICATION: Status: RESOLVED | Noted: 2024-06-20 | Resolved: 2024-07-25

## 2024-08-01 ENCOUNTER — APPOINTMENT (OUTPATIENT)
Dept: PRIMARY CARE | Facility: CLINIC | Age: 74
End: 2024-08-01
Payer: MEDICARE

## 2024-08-01 ENCOUNTER — HOSPITAL ENCOUNTER (OUTPATIENT)
Dept: RADIOLOGY | Facility: CLINIC | Age: 74
Discharge: HOME | End: 2024-08-01
Payer: MEDICARE

## 2024-08-01 VITALS
HEART RATE: 85 BPM | HEIGHT: 68 IN | DIASTOLIC BLOOD PRESSURE: 86 MMHG | TEMPERATURE: 96 F | WEIGHT: 264.8 LBS | SYSTOLIC BLOOD PRESSURE: 140 MMHG | BODY MASS INDEX: 40.13 KG/M2 | OXYGEN SATURATION: 97 %

## 2024-08-01 DIAGNOSIS — S89.92XA LEFT KNEE INJURY, INITIAL ENCOUNTER: ICD-10-CM

## 2024-08-01 DIAGNOSIS — N17.9 AKI (ACUTE KIDNEY INJURY) (CMS-HCC): ICD-10-CM

## 2024-08-01 DIAGNOSIS — Z12.11 SCREENING FOR COLORECTAL CANCER: ICD-10-CM

## 2024-08-01 DIAGNOSIS — Z00.00 ROUTINE GENERAL MEDICAL EXAMINATION AT HEALTH CARE FACILITY: Primary | ICD-10-CM

## 2024-08-01 DIAGNOSIS — I50.9 CHRONIC CONGESTIVE HEART FAILURE, UNSPECIFIED HEART FAILURE TYPE (MULTI): ICD-10-CM

## 2024-08-01 DIAGNOSIS — E66.01 CLASS 2 SEVERE OBESITY DUE TO EXCESS CALORIES WITH SERIOUS COMORBIDITY AND BODY MASS INDEX (BMI) OF 35.0 TO 35.9 IN ADULT (MULTI): ICD-10-CM

## 2024-08-01 DIAGNOSIS — I70.213 INTERMITTENT CLAUDICATION OF BOTH LOWER EXTREMITIES DUE TO ATHEROSCLEROSIS (CMS-HCC): ICD-10-CM

## 2024-08-01 DIAGNOSIS — Z12.5 SCREENING FOR PROSTATE CANCER: ICD-10-CM

## 2024-08-01 DIAGNOSIS — J61 PULMONARY ASBESTOSIS (MULTI): ICD-10-CM

## 2024-08-01 DIAGNOSIS — Z12.12 SCREENING FOR COLORECTAL CANCER: ICD-10-CM

## 2024-08-01 DIAGNOSIS — E78.2 MIXED HYPERLIPIDEMIA: ICD-10-CM

## 2024-08-01 DIAGNOSIS — I10 PRIMARY HYPERTENSION: ICD-10-CM

## 2024-08-01 LAB
ALBUMIN SERPL BCP-MCNC: 4 G/DL (ref 3.4–5)
ALP SERPL-CCNC: 94 U/L (ref 33–136)
ALT SERPL W P-5'-P-CCNC: 51 U/L (ref 10–52)
ANION GAP SERPL CALC-SCNC: 12 MMOL/L (ref 10–20)
APPEARANCE UR: CLEAR
AST SERPL W P-5'-P-CCNC: 43 U/L (ref 9–39)
BASOPHILS # BLD AUTO: 0.06 X10*3/UL (ref 0–0.1)
BASOPHILS NFR BLD AUTO: 1 %
BILIRUB SERPL-MCNC: 0.7 MG/DL (ref 0–1.2)
BILIRUB UR STRIP.AUTO-MCNC: NEGATIVE MG/DL
BUN SERPL-MCNC: 18 MG/DL (ref 6–23)
CALCIUM SERPL-MCNC: 9.3 MG/DL (ref 8.6–10.6)
CHLORIDE SERPL-SCNC: 108 MMOL/L (ref 98–107)
CHOLEST SERPL-MCNC: 112 MG/DL (ref 0–199)
CHOLESTEROL/HDL RATIO: 3
CO2 SERPL-SCNC: 26 MMOL/L (ref 21–32)
COLOR UR: ABNORMAL
CREAT SERPL-MCNC: 1.07 MG/DL (ref 0.5–1.3)
EGFRCR SERPLBLD CKD-EPI 2021: 74 ML/MIN/1.73M*2
EOSINOPHIL # BLD AUTO: 0.57 X10*3/UL (ref 0–0.4)
EOSINOPHIL NFR BLD AUTO: 9.4 %
ERYTHROCYTE [DISTWIDTH] IN BLOOD BY AUTOMATED COUNT: 14.3 % (ref 11.5–14.5)
GLUCOSE SERPL-MCNC: 115 MG/DL (ref 74–99)
GLUCOSE UR STRIP.AUTO-MCNC: NORMAL MG/DL
HCT VFR BLD AUTO: 49.4 % (ref 41–52)
HDLC SERPL-MCNC: 37.7 MG/DL
HGB BLD-MCNC: 15.9 G/DL (ref 13.5–17.5)
IMM GRANULOCYTES # BLD AUTO: 0.02 X10*3/UL (ref 0–0.5)
IMM GRANULOCYTES NFR BLD AUTO: 0.3 % (ref 0–0.9)
KETONES UR STRIP.AUTO-MCNC: NEGATIVE MG/DL
LDLC SERPL CALC-MCNC: 48 MG/DL
LEUKOCYTE ESTERASE UR QL STRIP.AUTO: ABNORMAL
LYMPHOCYTES # BLD AUTO: 1.93 X10*3/UL (ref 0.8–3)
LYMPHOCYTES NFR BLD AUTO: 31.7 %
MCH RBC QN AUTO: 30.9 PG (ref 26–34)
MCHC RBC AUTO-ENTMCNC: 32.2 G/DL (ref 32–36)
MCV RBC AUTO: 96 FL (ref 80–100)
MONOCYTES # BLD AUTO: 0.36 X10*3/UL (ref 0.05–0.8)
MONOCYTES NFR BLD AUTO: 5.9 %
MUCOUS THREADS #/AREA URNS AUTO: NORMAL /LPF
NEUTROPHILS # BLD AUTO: 3.14 X10*3/UL (ref 1.6–5.5)
NEUTROPHILS NFR BLD AUTO: 51.7 %
NITRITE UR QL STRIP.AUTO: NEGATIVE
NON HDL CHOLESTEROL: 74 MG/DL (ref 0–149)
NRBC BLD-RTO: 0 /100 WBCS (ref 0–0)
PH UR STRIP.AUTO: 5.5 [PH]
PLATELET # BLD AUTO: 213 X10*3/UL (ref 150–450)
POTASSIUM SERPL-SCNC: 4.8 MMOL/L (ref 3.5–5.3)
PROT SERPL-MCNC: 7.2 G/DL (ref 6.4–8.2)
PROT UR STRIP.AUTO-MCNC: ABNORMAL MG/DL
PSA SERPL-MCNC: 0.48 NG/ML
RBC # BLD AUTO: 5.15 X10*6/UL (ref 4.5–5.9)
RBC # UR STRIP.AUTO: NEGATIVE /UL
RBC #/AREA URNS AUTO: NORMAL /HPF
SODIUM SERPL-SCNC: 141 MMOL/L (ref 136–145)
SP GR UR STRIP.AUTO: 1.02
TRIGL SERPL-MCNC: 134 MG/DL (ref 0–149)
UROBILINOGEN UR STRIP.AUTO-MCNC: NORMAL MG/DL
VLDL: 27 MG/DL (ref 0–40)
WBC # BLD AUTO: 6.1 X10*3/UL (ref 4.4–11.3)
WBC #/AREA URNS AUTO: NORMAL /HPF

## 2024-08-01 PROCEDURE — 3008F BODY MASS INDEX DOCD: CPT | Performed by: FAMILY MEDICINE

## 2024-08-01 PROCEDURE — 80053 COMPREHEN METABOLIC PANEL: CPT

## 2024-08-01 PROCEDURE — 1160F RVW MEDS BY RX/DR IN RCRD: CPT | Performed by: FAMILY MEDICINE

## 2024-08-01 PROCEDURE — 1125F AMNT PAIN NOTED PAIN PRSNT: CPT | Performed by: FAMILY MEDICINE

## 2024-08-01 PROCEDURE — 1159F MED LIST DOCD IN RCRD: CPT | Performed by: FAMILY MEDICINE

## 2024-08-01 PROCEDURE — G0103 PSA SCREENING: HCPCS

## 2024-08-01 PROCEDURE — 73560 X-RAY EXAM OF KNEE 1 OR 2: CPT | Mod: LT

## 2024-08-01 PROCEDURE — 36415 COLL VENOUS BLD VENIPUNCTURE: CPT

## 2024-08-01 PROCEDURE — 3079F DIAST BP 80-89 MM HG: CPT | Performed by: FAMILY MEDICINE

## 2024-08-01 PROCEDURE — 99497 ADVNCD CARE PLAN 30 MIN: CPT | Performed by: FAMILY MEDICINE

## 2024-08-01 PROCEDURE — 81001 URINALYSIS AUTO W/SCOPE: CPT

## 2024-08-01 PROCEDURE — 99397 PER PM REEVAL EST PAT 65+ YR: CPT | Performed by: FAMILY MEDICINE

## 2024-08-01 PROCEDURE — 85025 COMPLETE CBC W/AUTO DIFF WBC: CPT

## 2024-08-01 PROCEDURE — 1157F ADVNC CARE PLAN IN RCRD: CPT | Performed by: FAMILY MEDICINE

## 2024-08-01 PROCEDURE — G0444 DEPRESSION SCREEN ANNUAL: HCPCS | Performed by: FAMILY MEDICINE

## 2024-08-01 PROCEDURE — G0439 PPPS, SUBSEQ VISIT: HCPCS | Performed by: FAMILY MEDICINE

## 2024-08-01 PROCEDURE — 80061 LIPID PANEL: CPT

## 2024-08-01 PROCEDURE — 3077F SYST BP >= 140 MM HG: CPT | Performed by: FAMILY MEDICINE

## 2024-08-01 PROCEDURE — 1036F TOBACCO NON-USER: CPT | Performed by: FAMILY MEDICINE

## 2024-08-01 PROCEDURE — 1170F FXNL STATUS ASSESSED: CPT | Performed by: FAMILY MEDICINE

## 2024-08-01 RX ORDER — METHYLPREDNISOLONE 4 MG/1
TABLET ORAL
Qty: 21 TABLET | Refills: 0 | Status: SHIPPED | OUTPATIENT
Start: 2024-08-01 | End: 2024-08-08

## 2024-08-01 ASSESSMENT — PATIENT HEALTH QUESTIONNAIRE - PHQ9
1. LITTLE INTEREST OR PLEASURE IN DOING THINGS: NOT AT ALL
SUM OF ALL RESPONSES TO PHQ9 QUESTIONS 1 AND 2: 0
2. FEELING DOWN, DEPRESSED OR HOPELESS: NOT AT ALL

## 2024-08-01 ASSESSMENT — ACTIVITIES OF DAILY LIVING (ADL)
GROCERY_SHOPPING: INDEPENDENT
DOING_HOUSEWORK: INDEPENDENT
DRESSING: INDEPENDENT
MANAGING_FINANCES: INDEPENDENT
BATHING: INDEPENDENT
TAKING_MEDICATION: INDEPENDENT

## 2024-08-01 ASSESSMENT — ENCOUNTER SYMPTOMS
OCCASIONAL FEELINGS OF UNSTEADINESS: 0
DEPRESSION: 0
LOSS OF SENSATION IN FEET: 0

## 2024-08-01 ASSESSMENT — PAIN SCALES - GENERAL: PAINLEVEL: 6

## 2024-08-01 NOTE — ACP (ADVANCE CARE PLANNING)
Confirming Previous Code Status:   Advance Care Planning Note     Discussion Date: 08/01/24   Discussion Participants: patient    The patient wishes to discuss Advance Care Planning today and the following is a brief summary of our discussion.     Patient has capacity to make their own medical decisions: Yes  Health Care Agent/Surrogate Decision Maker documented in chart: Yes    Documents on file and valid:  Advance Directive/Living Will: Yes   Health Care Power of : Yes  Other:     Communication of Medical Status/Prognosis:        Communication of Treatment Goals/Options:        Treatment Decisions  Goals of Care: survival is prioritized, if goals for quality or survival can reasonably be achieved     Follow Up Plan    Team Members    Time Statement: Total face to face time spent on advance care planning was 16 minutes with 16 minutes spent in counseling, including the explanation.    Pancho Gay DO  8/1/2024 10:36 AM

## 2024-08-01 NOTE — PROGRESS NOTES
Subjective   Reason for Visit: Mynor Blake is an 73 y.o. male here for a Medicare Wellness visit.     Past Medical, Surgical, and Family History reviewed and updated in chart.    Reviewed all medications by prescribing practitioner or clinical pharmacist (such as prescriptions, OTCs, herbal therapies and supplements) and documented in the medical record.    HPI  73-year-old male presents for Medicare wellness visit and complete physical exam.  Left knee injury approximately 2 to 3 weeks ago.  Patient slipped on his front porch and braced himself with his left knee.  Pain in the medial aspect of his left knee.  He has experienced swelling.  Patient Care Team:  Pancho FRANKS DO as PCP - General  Pancho FRANKS DO as PCP - [employer] PCP     Review of Systems  Constitutional: no chills, no fever and no night sweats.   Eyes: no blurred vision and no eyesight problems.   ENT: no hearing loss, no nasal congestion, no nasal discharge, no hoarseness and no sore throat.   Cardiovascular: no chest pain, no intermittent leg claudication, no lower extremity edema, no palpitations and no syncope.   Respiratory: no cough, no shortness of breath during exertion, no shortness of breath at rest and no wheezing.   Gastrointestinal: no abdominal pain, no blood in stools, no constipation, no diarrhea, no melena, no nausea, no rectal pain and no vomiting.   Genitourinary: no dysuria, no change in urinary frequency, no urinary hesitancy and no feelings of urinary urgency.   Neurological: no difficulty walking, no headache, no limb weakness, no numbness and no tingling.   Psychiatric: no anxiety, no depression, no anhedonia and no substance use disorders.   Endocrine: no recent weight gain and no recent weight loss.   Hematologic/Lymphatic: no tendency for easy bruising and no swollen glands.  Musculoskeletal: Left knee injury as per HPI.  Objective   Vitals:  /86 (BP Location: Right arm, Patient  "Position: Sitting, BP Cuff Size: Adult)   Pulse 85   Temp 35.6 °C (96 °F) (Temporal)   Ht 1.727 m (5' 8\")   Wt 120 kg (264 lb 12.8 oz)   SpO2 97%   BMI 40.26 kg/m²       Physical Exam  Constitutional: Alert and in no acute distress. Well developed, well nourished.   Eyes: Pupils were equal in size, round, reactive to light (PERRL) with normal accommodation and extraocular movements intact (EOMI). Ophthalmoscopic examination: Normal.   Ears, Nose, Mouth, and Throat: External inspection of ears and nose: Normal. Otoscopic examination: Normal. Lips, teeth, and gums: Normal. Oropharynx: Normal.   Neck: No neck mass was observed. Supple. Thyroid not enlarged and there were no palpable thyroid nodules.   Cardiovascular: Heart rate and rhythm were normal, normal S1 and S2, no gallops, no murmurs and no pericardial rub. Carotid pulses: Normal with no bruits. Abdominal aorta: Normal. Pedal pulses: Normal. No peripheral edema.   Pulmonary: No respiratory distress. Clear bilateral breath sounds.   Abdomen: Soft nontender; no abdominal mass palpated. Normal bowel sounds. No organomegaly.   Skin: Normal skin color and pigmentation, normal skin turgor, and no rash.   Psychiatric: Judgment and insight: Intact. Mood and affect: Normal.  Musculoskeletal: Left knee with mild effusion.  Left medial joint line tenderness to palpation.  Difficult exam due to discomfort.  Assessment/Plan   Problem List Items Addressed This Visit             ICD-10-CM    Hyperlipidemia E78.5    Hypertension I10    Relevant Orders    Lipid Panel    Comprehensive Metabolic Panel    CBC and Auto Differential    Urinalysis with Reflex Microscopic    Intermittent claudication of both lower extremities due to atherosclerosis (CMS-HCC) I70.213    Pulmonary asbestosis (Multi) J61    Screening for colorectal cancer Z12.11, Z12.12    Relevant Orders    Cologuard® colon cancer screening    Screening for prostate cancer Z12.5    Relevant Orders    Prostate " Specific Antigen, Screen    Routine general medical examination at health care facility - Primary Z00.00    Relevant Orders    1 Year Follow Up In Primary Care - Wellness Exam    Class 2 severe obesity due to excess calories with serious comorbidity and body mass index (BMI) of 35.0 to 35.9 in adult (Multi) E66.01, Z68.35    Chronic congestive heart failure, unspecified heart failure type (Multi) I50.9    CYN (acute kidney injury) (CMS-[facility]) N17.9    Left knee injury, initial encounter S89.92XA    Relevant Medications    methylPREDNISolone (Medrol Dospak) 4 mg tablets    Other Relevant Orders    XR knee left 1-2 views    Referral to Orthopaedic Surgery         Advance Directives Discussion  16 - 20 minutes were spent discussing Advanced Care Planning (including a Living Will, Medical Power Of , as well as specific end of life choices and/or directives). The details of that discussion were documented in Advanced Directives Discussion section of the medical record.     Depression Screening  10 - 15 minutes were spent screening for depression.

## 2024-08-05 ENCOUNTER — OFFICE VISIT (OUTPATIENT)
Dept: ORTHOPEDIC SURGERY | Facility: CLINIC | Age: 74
End: 2024-08-05
Payer: MEDICARE

## 2024-08-05 DIAGNOSIS — S89.92XA LEFT KNEE INJURY, INITIAL ENCOUNTER: ICD-10-CM

## 2024-08-05 DIAGNOSIS — M17.12 ARTHRITIS OF LEFT KNEE: Primary | ICD-10-CM

## 2024-08-05 DIAGNOSIS — Z87.39 HISTORY OF GOUT: ICD-10-CM

## 2024-08-05 PROCEDURE — 20610 DRAIN/INJ JOINT/BURSA W/O US: CPT | Performed by: ORTHOPAEDIC SURGERY

## 2024-08-05 PROCEDURE — 1036F TOBACCO NON-USER: CPT | Performed by: ORTHOPAEDIC SURGERY

## 2024-08-05 PROCEDURE — 99203 OFFICE O/P NEW LOW 30 MIN: CPT | Performed by: ORTHOPAEDIC SURGERY

## 2024-08-05 PROCEDURE — 1157F ADVNC CARE PLAN IN RCRD: CPT | Performed by: ORTHOPAEDIC SURGERY

## 2024-08-05 PROCEDURE — 1159F MED LIST DOCD IN RCRD: CPT | Performed by: ORTHOPAEDIC SURGERY

## 2024-08-05 PROCEDURE — 1160F RVW MEDS BY RX/DR IN RCRD: CPT | Performed by: ORTHOPAEDIC SURGERY

## 2024-08-05 RX ORDER — TRIAMCINOLONE ACETONIDE 40 MG/ML
40 INJECTION, SUSPENSION INTRA-ARTICULAR; INTRAMUSCULAR
Status: COMPLETED | OUTPATIENT
Start: 2024-08-05 | End: 2024-08-05

## 2024-08-05 RX ORDER — LIDOCAINE HYDROCHLORIDE 10 MG/ML
2 INJECTION INFILTRATION; PERINEURAL
Status: COMPLETED | OUTPATIENT
Start: 2024-08-05 | End: 2024-08-05

## 2024-08-05 ASSESSMENT — ENCOUNTER SYMPTOMS: KNEE DEFORMITY: 1

## 2024-08-05 NOTE — PROGRESS NOTES
Subjective    Patient ID: Mynor Blake is a 73 y.o. male.    Chief Complaint: Pain of the Left Knee       This is a 73-year-old male who presents for evaluation of left knee pain is referred by his primary care physician.  Patient states he has had at least 3 to 4 weeks of left knee pain.  He feels this is directly related to a twisting injury when he was stepping off of his porch.  Subsequently he has had recurrent episodes of sharp stabbing pain along the medial aspect of the knee.  He has also noted tenderness in that area.  His PCP put him on a Medrol Dosepak with some improvement with regard to his symptoms.  He has noted swelling and slight warmth.  He has also tried Aleve, modifications of activities and a lidocaine patch.  The pain does not wake him from sleep at night.    X-rays reviewed today demonstrate mild to moderate arthritic change of the left knee with medial joint space narrowing.  No evidence of fracture.  No evidence of loose body.    This patient's past medical, social, and family history were reviewed as well as a review of systems including updates on the patient's information encounter sheet  Patient denies any history of diabetes  Patient does have a history of gout    Physical Examination  Constitutional: Patient's height and weight reviewed, appears well kempt  Psychiatric: Alert and oriented ×3, appropriate mood and behavior  Pulmonary: Breathing appears nonlabored, no apparent distress  Lymphatic: No appreciable lymphadenopathy to both the upper and lower extremities  Skin: No open lesions, rashes, ulcerations  Neurologic: Gross motor and sensory exam appear intact (except for abnormalities noted in the below muscle skeletal exam)    Musculoskeletal: There appears to be satisfactory motion left hip without groin or thigh pain elicited.  The left knee reveals a small to mild effusion without erythema or warmth.  There is a lack of extension of 5 degrees and flexion limited to 115  degrees.  Localized medial joint line tenderness.  Increasing pain with Snow examination.  No gross ligamentous instability.  Ambulates with a slightly antalgic gait.    Assessment: Mild to moderate arthritis left knee potentially aggravated by history of gout    Plan: An extended discussion ensued with the patient regarding the treatment options for their knee condition. This included both nonoperative and operative treatment options.. The patient will continue with modifications of activities of daily living as well as an exercise program. As the patient desired an intra-articular knee injection of Kenalog/lidocaine was performed today and tolerated well.. The patient will observe to see if the injection is of benefit. Follow-up on a when necessary basis.    Left Knee     L Inj/Asp: R knee on 8/5/2024 2:27 PM  Indications: pain  Details: 22 G needle, anterolateral approach  Medications: 40 mg triamcinolone acetonide 40 mg/mL; 2 mL lidocaine 10 mg/mL (1 %)  Consent was given by the patient. Patient was prepped and draped in the usual sterile fashion.               Current Outpatient Medications:     allopurinol (Zyloprim) 100 mg tablet, Take 1 tablet (100 mg) by mouth once daily., Disp: 90 tablet, Rfl: 0    allopurinol (Zyloprim) 300 mg tablet, Take 1 tablet (300 mg) by mouth once daily. as directed, Disp: 90 tablet, Rfl: 0    colchicine 0.6 mg tablet, , Disp: , Rfl:     methylPREDNISolone (Medrol Dospak) 4 mg tablets, Take as directed on package., Disp: 21 tablet, Rfl: 0    olmesartan (BENIcar) 40 mg tablet, TAKE 1 TABLET BY MOUTH EVERY DAY, Disp: 90 tablet, Rfl: 3    rosuvastatin (Crestor) 40 mg tablet, TAKE 1 TABLET BY MOUTH EVERY DAY, Disp: 90 tablet, Rfl: 3    tiZANidine (Zanaflex) 4 mg tablet, Take 1 tablet (4 mg) by mouth every 6 hours if needed for muscle spasms for up to 5 days., Disp: 20 tablet, Rfl: 0

## 2024-08-07 LAB — NONINV COLON CA DNA+OCC BLD SCRN STL QL: NORMAL

## 2024-08-13 ENCOUNTER — HOSPITAL ENCOUNTER (OUTPATIENT)
Dept: CARDIOLOGY | Facility: CLINIC | Age: 74
Discharge: HOME | End: 2024-08-13
Payer: MEDICARE

## 2024-08-13 ENCOUNTER — APPOINTMENT (OUTPATIENT)
Dept: CARDIOLOGY | Facility: CLINIC | Age: 74
End: 2024-08-13
Payer: MEDICARE

## 2024-08-13 VITALS
HEIGHT: 66 IN | BODY MASS INDEX: 42.27 KG/M2 | DIASTOLIC BLOOD PRESSURE: 72 MMHG | OXYGEN SATURATION: 98 % | SYSTOLIC BLOOD PRESSURE: 126 MMHG | HEART RATE: 72 BPM | WEIGHT: 263 LBS

## 2024-08-13 VITALS
SYSTOLIC BLOOD PRESSURE: 140 MMHG | WEIGHT: 264 LBS | DIASTOLIC BLOOD PRESSURE: 86 MMHG | HEIGHT: 68 IN | BODY MASS INDEX: 40.01 KG/M2

## 2024-08-13 DIAGNOSIS — I15.9 SECONDARY HYPERTENSION: Primary | ICD-10-CM

## 2024-08-13 DIAGNOSIS — R06.02 SHORTNESS OF BREATH: ICD-10-CM

## 2024-08-13 DIAGNOSIS — I70.213 INTERMITTENT CLAUDICATION OF BOTH LOWER EXTREMITIES DUE TO ATHEROSCLEROSIS (CMS-HCC): ICD-10-CM

## 2024-08-13 DIAGNOSIS — Z95.2 S/P AORTIC VALVE REPLACEMENT: ICD-10-CM

## 2024-08-13 DIAGNOSIS — E78.2 MIXED HYPERLIPIDEMIA: ICD-10-CM

## 2024-08-13 DIAGNOSIS — I25.10 CORONARY ARTERY DISEASE INVOLVING NATIVE CORONARY ARTERY OF NATIVE HEART WITHOUT ANGINA PECTORIS: ICD-10-CM

## 2024-08-13 DIAGNOSIS — I25.10 ATHEROSCLEROTIC HEART DISEASE OF NATIVE CORONARY ARTERY WITHOUT ANGINA PECTORIS: ICD-10-CM

## 2024-08-13 DIAGNOSIS — I35.0 NONRHEUMATIC AORTIC (VALVE) STENOSIS: ICD-10-CM

## 2024-08-13 DIAGNOSIS — Z95.1 H/O FOUR VESSEL CORONARY ARTERY BYPASS GRAFT: ICD-10-CM

## 2024-08-13 PROBLEM — I50.9 CHRONIC CONGESTIVE HEART FAILURE, UNSPECIFIED HEART FAILURE TYPE (MULTI): Status: RESOLVED | Noted: 2023-07-31 | Resolved: 2024-08-13

## 2024-08-13 PROCEDURE — 93306 TTE W/DOPPLER COMPLETE: CPT | Performed by: INTERNAL MEDICINE

## 2024-08-13 PROCEDURE — 99213 OFFICE O/P EST LOW 20 MIN: CPT | Performed by: INTERNAL MEDICINE

## 2024-08-13 PROCEDURE — 93010 ELECTROCARDIOGRAM REPORT: CPT | Performed by: INTERNAL MEDICINE

## 2024-08-13 PROCEDURE — 99213 OFFICE O/P EST LOW 20 MIN: CPT | Mod: 25 | Performed by: INTERNAL MEDICINE

## 2024-08-13 PROCEDURE — 2500000004 HC RX 250 GENERAL PHARMACY W/ HCPCS (ALT 636 FOR OP/ED): Performed by: INTERNAL MEDICINE

## 2024-08-13 PROCEDURE — 1159F MED LIST DOCD IN RCRD: CPT | Performed by: INTERNAL MEDICINE

## 2024-08-13 PROCEDURE — 93005 ELECTROCARDIOGRAM TRACING: CPT | Performed by: INTERNAL MEDICINE

## 2024-08-13 PROCEDURE — 3078F DIAST BP <80 MM HG: CPT | Performed by: INTERNAL MEDICINE

## 2024-08-13 PROCEDURE — 3008F BODY MASS INDEX DOCD: CPT | Performed by: INTERNAL MEDICINE

## 2024-08-13 PROCEDURE — 3074F SYST BP LT 130 MM HG: CPT | Performed by: INTERNAL MEDICINE

## 2024-08-13 PROCEDURE — 93306 TTE W/DOPPLER COMPLETE: CPT

## 2024-08-13 PROCEDURE — 1157F ADVNC CARE PLAN IN RCRD: CPT | Performed by: INTERNAL MEDICINE

## 2024-08-13 PROCEDURE — 1036F TOBACCO NON-USER: CPT | Performed by: INTERNAL MEDICINE

## 2024-08-13 ASSESSMENT — ENCOUNTER SYMPTOMS: DYSPNEA ON EXERTION: 1

## 2024-08-13 NOTE — PROGRESS NOTES
Subjective   Mynor Blake is a 73 y.o. male.    Chief Complaint:  Follow-up aortic valve replacement.    HPI    Since his last visit he has been asymptomatic with respect to his coronary artery disease.  No anginal symptoms.  No increased shortness of breath from his baseline.  He has had no other medical problems except for significant arthritis in his left knee.    The patient underwent an echocardiographic study in 2018 which demonstrated severe aortic stenosis with a peak gradient of over 100 mmHg and a mean gradient of 60 mmHg. He underwent cardiac catheterization, which demonstrated patent bypass grafts with a left internal mammary graft to the anterior descending, a free right internal mammary graft to the circumflex and a patent saphenous vein graft to the posterolateral branch. He has normal left ventricular function. His coronary artery bypass grafting procedure was performed in November 2010.     The patient underwent aortic valve replacement on April 19, 2018 with implantation of a #25 freestyle bioprosthetic aortic valve prosthesis.      Allergies  Medication    · No Known Drug Allergies   Recorded By: Amy Pettit; 11/9/2015 1:25:37 PM     Family History  Mother    · Family history of cardiac disorder (V17.49) (Z82.49)  Father    · Family history of diabetes mellitus (V18.0) (Z83.3)  Sibling    · No pertinent family history     Social History  Problems    · Does not use illicit drugs (V49.89) (Z78.9)   · Never a smoker   · Never used tobacco (V49.89) (Z78.9)   · Occasional alcohol use    Review of Systems   Cardiovascular:  Positive for dyspnea on exertion.   Musculoskeletal:  Positive for arthritis and joint pain.       Current Outpatient Medications   Medication Sig Dispense Refill    allopurinol (Zyloprim) 100 mg tablet Take 1 tablet (100 mg) by mouth once daily. 90 tablet 0    allopurinol (Zyloprim) 300 mg tablet Take 1 tablet (300 mg) by mouth once daily. as directed 90 tablet 0     colchicine 0.6 mg tablet       olmesartan (BENIcar) 40 mg tablet TAKE 1 TABLET BY MOUTH EVERY DAY 90 tablet 3    rosuvastatin (Crestor) 40 mg tablet TAKE 1 TABLET BY MOUTH EVERY DAY 90 tablet 3    tiZANidine (Zanaflex) 4 mg tablet Take 1 tablet (4 mg) by mouth every 6 hours if needed for muscle spasms for up to 5 days. 20 tablet 0     No current facility-administered medications for this visit.        Visit Vitals  Smoking Status Never        Objective     Constitutional:       Appearance: Not in distress.   Neck:      Vascular: JVD normal.   Pulmonary:      Breath sounds: Normal breath sounds.   Cardiovascular:      Normal rate. Regular rhythm. S1 with normal intensity. S2 with normal intensity.       Murmurs: There is a grade 1/6 systolic murmur.      No gallop.    Pulses:     Decreased pulses.   Edema:     Peripheral edema absent.   Abdominal:      General: Bowel sounds are normal.   Neurological:      Mental Status: Alert and oriented to person, place and time.         Lab Review:   Lab Results   Component Value Date     08/01/2024    K 4.8 08/01/2024     (H) 08/01/2024    CO2 26 08/01/2024    BUN 18 08/01/2024    CREATININE 1.07 08/01/2024    GLUCOSE 115 (H) 08/01/2024    CALCIUM 9.3 08/01/2024     Lab Results   Component Value Date    CHOL 112 08/01/2024    TRIG 134 08/01/2024    HDL 37.7 08/01/2024       Assessment:    1.  Coronary artery disease.  Has a left internal mammary graft to the anterior descending and her free right internal mammary graft to the circumflex coronary artery.  Past echocardiographic studies have demonstrated normal left ventricular function.  His bypass procedure was done in 2010.  Today's echocardiographic study shows no wall motion abnormalities with normal left ventricular function.    2.  Aortic stenosis.  Status post aortic valve replacement.  Today's echocardiographic study shows normal valvular function.    3.  Hypertension.  Blood pressures are normal.    4.   Hyperlipidemia.  Latest LDL is 48.

## 2024-08-14 LAB
AORTIC VALVE MEAN GRADIENT: 5.7 MMHG
AORTIC VALVE PEAK VELOCITY: 1.78 M/S
AV PEAK GRADIENT: 12.7 MMHG
AVA (PEAK VEL): 2.42 CM2
AVA (VTI): 2.5 CM2
EJECTION FRACTION APICAL 4 CHAMBER: 58.1
EJECTION FRACTION: 63 %
LEFT ATRIUM VOLUME AREA LENGTH INDEX BSA: 39.9 ML/M2
LEFT VENTRICLE INTERNAL DIMENSION DIASTOLE: 4.3 CM (ref 3.5–6)
LEFT VENTRICULAR OUTFLOW TRACT DIAMETER: 2.2 CM
MITRAL VALVE E/A RATIO: 0.75
RIGHT VENTRICLE FREE WALL PEAK S': 1 CM/S
RIGHT VENTRICLE PEAK SYSTOLIC PRESSURE: 14.2 MMHG
TRICUSPID ANNULAR PLANE SYSTOLIC EXCURSION: 2 CM

## 2024-08-21 ENCOUNTER — LAB (OUTPATIENT)
Dept: LAB | Facility: LAB | Age: 74
End: 2024-08-21
Payer: MEDICARE

## 2024-08-21 ENCOUNTER — APPOINTMENT (OUTPATIENT)
Dept: RHEUMATOLOGY | Facility: CLINIC | Age: 74
End: 2024-08-21
Payer: MEDICARE

## 2024-08-21 VITALS
HEART RATE: 84 BPM | WEIGHT: 256.9 LBS | BODY MASS INDEX: 41.46 KG/M2 | SYSTOLIC BLOOD PRESSURE: 160 MMHG | DIASTOLIC BLOOD PRESSURE: 91 MMHG

## 2024-08-21 DIAGNOSIS — M1A.9XX1 CHRONIC TOPHACEOUS GOUT: Primary | ICD-10-CM

## 2024-08-21 DIAGNOSIS — M1A.9XX1 CHRONIC TOPHACEOUS GOUT: ICD-10-CM

## 2024-08-21 LAB
ALBUMIN SERPL BCP-MCNC: 4.4 G/DL (ref 3.4–5)
ALP SERPL-CCNC: 84 U/L (ref 33–136)
ALT SERPL W P-5'-P-CCNC: 47 U/L (ref 10–52)
ANION GAP SERPL CALC-SCNC: 15 MMOL/L (ref 10–20)
AST SERPL W P-5'-P-CCNC: 38 U/L (ref 9–39)
BILIRUB SERPL-MCNC: 1.5 MG/DL (ref 0–1.2)
BUN SERPL-MCNC: 18 MG/DL (ref 6–23)
CALCIUM SERPL-MCNC: 9.8 MG/DL (ref 8.6–10.6)
CHLORIDE SERPL-SCNC: 104 MMOL/L (ref 98–107)
CO2 SERPL-SCNC: 27 MMOL/L (ref 21–32)
CREAT SERPL-MCNC: 1.08 MG/DL (ref 0.5–1.3)
EGFRCR SERPLBLD CKD-EPI 2021: 73 ML/MIN/1.73M*2
GLUCOSE SERPL-MCNC: 77 MG/DL (ref 74–99)
POTASSIUM SERPL-SCNC: 4.8 MMOL/L (ref 3.5–5.3)
PROT SERPL-MCNC: 7.4 G/DL (ref 6.4–8.2)
SODIUM SERPL-SCNC: 141 MMOL/L (ref 136–145)
URATE SERPL-MCNC: 4.8 MG/DL (ref 4–7.5)

## 2024-08-21 PROCEDURE — 36415 COLL VENOUS BLD VENIPUNCTURE: CPT

## 2024-08-21 PROCEDURE — 84550 ASSAY OF BLOOD/URIC ACID: CPT

## 2024-08-21 PROCEDURE — 80053 COMPREHEN METABOLIC PANEL: CPT

## 2024-08-21 PROCEDURE — 1157F ADVNC CARE PLAN IN RCRD: CPT | Performed by: INTERNAL MEDICINE

## 2024-08-21 PROCEDURE — 1159F MED LIST DOCD IN RCRD: CPT | Performed by: INTERNAL MEDICINE

## 2024-08-21 PROCEDURE — 1036F TOBACCO NON-USER: CPT | Performed by: INTERNAL MEDICINE

## 2024-08-21 PROCEDURE — 1160F RVW MEDS BY RX/DR IN RCRD: CPT | Performed by: INTERNAL MEDICINE

## 2024-08-21 PROCEDURE — 3077F SYST BP >= 140 MM HG: CPT | Performed by: INTERNAL MEDICINE

## 2024-08-21 PROCEDURE — 99213 OFFICE O/P EST LOW 20 MIN: CPT | Performed by: INTERNAL MEDICINE

## 2024-08-21 PROCEDURE — 1126F AMNT PAIN NOTED NONE PRSNT: CPT | Performed by: INTERNAL MEDICINE

## 2024-08-21 PROCEDURE — 3080F DIAST BP >= 90 MM HG: CPT | Performed by: INTERNAL MEDICINE

## 2024-08-21 ASSESSMENT — PAIN SCALES - GENERAL: PAINLEVEL: 0-NO PAIN

## 2024-08-21 NOTE — PROGRESS NOTES
Follow-up Rheumatology Patient Visit    Chief Complaint:  Mynor Blake is a 73 y.o. male presenting today for 6 month FUV.    History of Presenting Problem:   74 y/o male with CAD s/p CABG Hx of Gout arthropathy on Allopurinol present for evaluation. 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 He does report a Hx of gout flares mainly in the elbows and occasionally in his feet. His last Gout flare was December 2021.  His PCP recently got rid of HCTZ and it seem to have helped the Gout controlled.   He denies any recent Gout attacks.   Today He report recent issue with left knee after he chased his Dog  a couple weeks ago he has seen orthopedics and was told he has had cartilage loss. He was also given a steroid taper and joint injection which did help.     Problem List:   Patient Active Problem List   Diagnosis    Acute bilateral low back pain without sciatica    Aortic stenosis    Cellulitis of left upper extremity    Coronary artery disease    Dog bite, hand    ED (erectile dysfunction)    Edema of both feet    Chronic gout    Chronic tophaceous gout    Gout    H/O four vessel coronary artery bypass graft    S/P aortic valve replacement    Hyperlipidemia    Hypertension    Impingement syndrome of right shoulder    Intermittent claudication of both lower extremities due to atherosclerosis (CMS-HCC)    Pulmonary asbestosis (Multi)    Infection of right olecranon bursa    Septic olecranon bursitis of left elbow    Shortness of breath    Upper respiratory infection, acute    Need for vaccination    Need for hepatitis C screening test    Screening for colorectal cancer    Screening for prostate cancer    Routine general medical examination at health care facility    Class 2 severe obesity due to excess calories with serious comorbidity and body mass index (BMI) of 35.0 to 35.9 in adult (Multi)    Sebaceous cyst    Acute right-sided low back pain with  right-sided sciatica    Acute gout of right ankle    CYN (acute kidney injury) (CMS-[facility])    Astigmatism    Chronic gout of multiple sites    Combined forms of age-related cataract of both eyes    Dermatochalasis of both eyelids    Dog bite    Elbow pain    Fever    History of repair of retinal tear by laser photocoagulation    Impingement syndrome of shoulder region    Lattice degeneration of retina    Myopia with presbyopia of both eyes    PVD (posterior vitreous detachment), right eye    S/P cataract extraction and insertion of intraocular lens    Left knee injury, initial encounter       Past Medical History:   Past Medical History:   Diagnosis Date    Atherosclerosis of native arteries of extremities with intermittent claudication, right leg (CMS-[facility])     Atherosclerosis of native arteries of extremities with intermittent claudication, right leg    Morbid (severe) obesity due to excess calories (Multi) 12/15/2021    Class 2 severe obesity due to excess calories with serious comorbidity and body mass index (BMI) of 35.0 to 35.9 in adult    Nonrheumatic aortic (valve) stenosis     Severe aortic stenosis    Other hyperlipidemia     Other hyperlipidemia    Personal history of other diseases of the circulatory system     History of coronary artery disease    Personal history of other diseases of the circulatory system     History of claudication    Personal history of other diseases of the musculoskeletal system and connective tissue 03/26/2018    History of gout    Personal history of other specified conditions     History of shortness of breath    Presence of aortocoronary bypass graft     S/P CABG x 3    Pyothorax without fistula (Multi) 07/11/2018    Abscess of chest       Surgical History:   Past Surgical History:   Procedure Laterality Date    CHOLECYSTECTOMY  03/26/2018    Cholecystectomy    CORONARY ARTERY BYPASS GRAFT  04/30/2018    CABG    OTHER SURGICAL HISTORY  04/12/2022    Cataract surgery    OTHER  SURGICAL HISTORY  04/12/2022    Bypass        Allergies: No Known Allergies    Medications:   Current Outpatient Medications:     allopurinol (Zyloprim) 100 mg tablet, Take 1 tablet (100 mg) by mouth once daily., Disp: 90 tablet, Rfl: 0    allopurinol (Zyloprim) 300 mg tablet, Take 1 tablet (300 mg) by mouth once daily. as directed, Disp: 90 tablet, Rfl: 0    colchicine 0.6 mg tablet, , Disp: , Rfl:     olmesartan (BENIcar) 40 mg tablet, TAKE 1 TABLET BY MOUTH EVERY DAY, Disp: 90 tablet, Rfl: 3    rosuvastatin (Crestor) 40 mg tablet, TAKE 1 TABLET BY MOUTH EVERY DAY, Disp: 90 tablet, Rfl: 3    tiZANidine (Zanaflex) 4 mg tablet, Take 1 tablet (4 mg) by mouth every 6 hours if needed for muscle spasms for up to 5 days., Disp: 20 tablet, Rfl: 0      Objective   Physical Examination:    Visit Vitals  BP (!) 160/91 (BP Location: Right arm, Patient Position: Sitting)   Pulse 84   Wt 117 kg (256 lb 14.4 oz)   BMI 41.46 kg/m²   Smoking Status Never   BSA 2.33 m²        Gen: NAD, A&O x 3  HEENT: clear sclera and conjunctiva,     Musculoskeletal:   Neck; WNL, full ROM  Shoulder: WNL, full ROM  Elbow:WNL, full ROM, no effusion noted  Wrist and fingers;no active synovitis noted, Full ROM in the Wrist , Good fist and   Knees:  No effusions or crepitation, full ROM.  Hips; WNL, full ROM, Negative Juaquin test  Ankle, Feet; WNL, full ROM    Skin: No rashes or lesions seen, no nail changes  Neuro: A&O x3, Normal Gait    Procedures :None    Orders:  Orders Placed This Encounter   Procedures    Uric Acid    Comprehensive Metabolic Panel        Provider Impression:   Assessment/Plan   Encounter Diagnosis   Name Primary?    Chronic tophaceous gout Yes          74 y/o male with CAD s/p CABG Hx of Gout arthropathy on Allopurinol present for evaluation. 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 He has not reported an new gout attack since regimen adjustment  -Will continue at  current dose (400 mg) but may need to increase to  allopurinol 500 mg given uric acid is not at goal. Did discuss Krystexxa as an option if he continues to have frequent flares but patient not interested at this time.    -Continue colchicine every other day  -Check uric acid, hepatic panel and creatinine   -Follow-up in 6 months

## 2024-09-28 DIAGNOSIS — M1A.9XX1 CHRONIC GOUT, UNSPECIFIED, WITH TOPHUS (TOPHI): ICD-10-CM

## 2024-09-30 RX ORDER — ALLOPURINOL 100 MG/1
200 TABLET ORAL DAILY
Qty: 180 TABLET | Refills: 1 | Status: SHIPPED | OUTPATIENT
Start: 2024-09-30

## 2024-09-30 RX ORDER — ALLOPURINOL 300 MG/1
300 TABLET ORAL DAILY
Qty: 90 TABLET | Refills: 1 | Status: SHIPPED | OUTPATIENT
Start: 2024-09-30

## 2024-10-26 DIAGNOSIS — I10 PRIMARY HYPERTENSION: ICD-10-CM

## 2024-10-28 RX ORDER — OLMESARTAN MEDOXOMIL 40 MG/1
40 TABLET ORAL DAILY
Qty: 90 TABLET | Refills: 3 | Status: SHIPPED | OUTPATIENT
Start: 2024-10-28

## 2024-11-07 DIAGNOSIS — M1A.9XX1 CHRONIC GOUT, UNSPECIFIED, WITH TOPHUS (TOPHI): ICD-10-CM

## 2024-11-07 RX ORDER — COLCHICINE 0.6 MG/1
TABLET ORAL
Qty: 45 TABLET | Refills: 1 | Status: SHIPPED | OUTPATIENT
Start: 2024-11-07

## 2024-12-09 ENCOUNTER — OFFICE VISIT (OUTPATIENT)
Dept: ORTHOPEDIC SURGERY | Facility: CLINIC | Age: 74
End: 2024-12-09
Payer: MEDICARE

## 2024-12-09 DIAGNOSIS — G89.29 CHRONIC PAIN OF LEFT KNEE: ICD-10-CM

## 2024-12-09 DIAGNOSIS — Z87.39 HISTORY OF GOUT: ICD-10-CM

## 2024-12-09 DIAGNOSIS — M25.562 CHRONIC PAIN OF LEFT KNEE: ICD-10-CM

## 2024-12-09 DIAGNOSIS — M17.12 ARTHRITIS OF LEFT KNEE: Primary | ICD-10-CM

## 2024-12-09 PROCEDURE — 2500000004 HC RX 250 GENERAL PHARMACY W/ HCPCS (ALT 636 FOR OP/ED): Performed by: ORTHOPAEDIC SURGERY

## 2024-12-09 PROCEDURE — 1159F MED LIST DOCD IN RCRD: CPT | Performed by: ORTHOPAEDIC SURGERY

## 2024-12-09 PROCEDURE — 1157F ADVNC CARE PLAN IN RCRD: CPT | Performed by: ORTHOPAEDIC SURGERY

## 2024-12-09 PROCEDURE — 1036F TOBACCO NON-USER: CPT | Performed by: ORTHOPAEDIC SURGERY

## 2024-12-09 PROCEDURE — 99213 OFFICE O/P EST LOW 20 MIN: CPT | Performed by: ORTHOPAEDIC SURGERY

## 2024-12-09 PROCEDURE — 1160F RVW MEDS BY RX/DR IN RCRD: CPT | Performed by: ORTHOPAEDIC SURGERY

## 2024-12-09 PROCEDURE — 20610 DRAIN/INJ JOINT/BURSA W/O US: CPT | Mod: LT | Performed by: ORTHOPAEDIC SURGERY

## 2024-12-09 RX ORDER — LIDOCAINE HYDROCHLORIDE 10 MG/ML
2 INJECTION, SOLUTION INFILTRATION; PERINEURAL
Status: COMPLETED | OUTPATIENT
Start: 2024-12-09 | End: 2024-12-09

## 2024-12-09 RX ORDER — TRIAMCINOLONE ACETONIDE 40 MG/ML
40 INJECTION, SUSPENSION INTRA-ARTICULAR; INTRAMUSCULAR
Status: COMPLETED | OUTPATIENT
Start: 2024-12-09 | End: 2024-12-09

## 2024-12-09 ASSESSMENT — ENCOUNTER SYMPTOMS: KNEE DEFORMITY: 1

## 2024-12-09 NOTE — PROGRESS NOTES
Subjective    Patient ID: Mynor Blake is a 73 y.o. male.    Chief Complaint: Follow-up of the Left Knee       Is a 73-year-old male with a long history of arthritis involving his left knee.  This is likely exacerbated at times by his history of gout.  Previous left knee intra-articular injection of Kenalog 4 months ago was of dramatic benefit.  Unfortunate over the last month he has noted increasing pain and in fact 1 week ago the pain became much more severe.  The pain was mostly along the medial aspect of the knee.  He also noted pain to touch.  He denies any fevers or chills.    This patient's past medical, social, and family history were reviewed as well as a review of systems including updates on the patient's information encounter sheet    Physical Examination  Constitutional: Patient's height and weight reviewed, appears well kempt  Psychiatric: Alert and oriented ×3, appropriate mood and behavior  Pulmonary: Breathing appears nonlabored, no apparent distress  Lymphatic: No appreciable lymphadenopathy to both the upper and lower extremities  Skin: No open lesions, rashes, ulcerations  Neurologic: Gross motor and sensory exam appear intact (except for abnormalities noted in the below muscle skeletal exam)    Musculoskeletal: There appears to be satisfactory motion left hip without groin pain elicited.  The left knee reveals lack of extension of 5 degrees and flexion of 110 degrees.  Small effusion without erythema.  Slight warmth along the medial aspect of the knee.  Localized medial joint line tenderness.  No gross ligamentous instability.    Assessment: Arthritis left knee occasionally aggravated by gout    Plan: An extended discussion ensued with the patient regarding the treatment options for their knee condition. This included both nonoperative and operative treatment options.. The patient will continue with modifications of activities of daily living as well as an exercise program. As the patient  desired an intra-articular knee injection of Kenalog/lidocaine was performed today and tolerated well.. The patient will observe to see if the injection is of benefit. Follow-up on a when necessary basis.    Left Knee     L Inj/Asp: L knee on 12/9/2024 1:41 PM  Indications: pain  Details: 22 G needle, anterolateral approach  Medications: 40 mg triamcinolone acetonide 40 mg/mL; 2 mL lidocaine 10 mg/mL (1 %)  Consent was given by the patient. Patient was prepped and draped in the usual sterile fashion.               Current Outpatient Medications:     allopurinol (Zyloprim) 100 mg tablet, TAKE 2 TABLETS BY MOUTH ONCE DAILY., Disp: 180 tablet, Rfl: 1    allopurinol (Zyloprim) 300 mg tablet, TAKE 1 TABLET (300 MG) BY MOUTH ONCE DAILY. AS DIRECTED, Disp: 90 tablet, Rfl: 1    colchicine 0.6 mg tablet, TAKE 1 TABLET (0.6 MG) BY MOUTH EVERY OTHER DAY., Disp: 45 tablet, Rfl: 1    olmesartan (BENIcar) 40 mg tablet, TAKE 1 TABLET BY MOUTH EVERY DAY, Disp: 90 tablet, Rfl: 3    rosuvastatin (Crestor) 40 mg tablet, TAKE 1 TABLET BY MOUTH EVERY DAY, Disp: 90 tablet, Rfl: 3    tiZANidine (Zanaflex) 4 mg tablet, Take 1 tablet (4 mg) by mouth every 6 hours if needed for muscle spasms for up to 5 days., Disp: 20 tablet, Rfl: 0

## 2025-01-14 ENCOUNTER — OFFICE VISIT (OUTPATIENT)
Dept: PRIMARY CARE | Facility: CLINIC | Age: 75
End: 2025-01-14
Payer: MEDICARE

## 2025-01-14 VITALS
HEIGHT: 66 IN | SYSTOLIC BLOOD PRESSURE: 122 MMHG | DIASTOLIC BLOOD PRESSURE: 64 MMHG | HEART RATE: 88 BPM | BODY MASS INDEX: 41.01 KG/M2 | OXYGEN SATURATION: 94 % | WEIGHT: 255.2 LBS

## 2025-01-14 DIAGNOSIS — Z12.11 ENCOUNTER FOR SCREENING FOR MALIGNANT NEOPLASM OF COLON: ICD-10-CM

## 2025-01-14 DIAGNOSIS — M60.9 MYOSITIS OF MULTIPLE SITES, UNSPECIFIED MYOSITIS TYPE: Primary | ICD-10-CM

## 2025-01-14 DIAGNOSIS — I10 PRIMARY HYPERTENSION: ICD-10-CM

## 2025-01-14 DIAGNOSIS — E78.2 MIXED HYPERLIPIDEMIA: ICD-10-CM

## 2025-01-14 PROCEDURE — 1124F ACP DISCUSS-NO DSCNMKR DOCD: CPT | Performed by: FAMILY MEDICINE

## 2025-01-14 PROCEDURE — 1036F TOBACCO NON-USER: CPT | Performed by: FAMILY MEDICINE

## 2025-01-14 PROCEDURE — 1159F MED LIST DOCD IN RCRD: CPT | Performed by: FAMILY MEDICINE

## 2025-01-14 PROCEDURE — 1157F ADVNC CARE PLAN IN RCRD: CPT | Performed by: FAMILY MEDICINE

## 2025-01-14 PROCEDURE — 3074F SYST BP LT 130 MM HG: CPT | Performed by: FAMILY MEDICINE

## 2025-01-14 PROCEDURE — 3008F BODY MASS INDEX DOCD: CPT | Performed by: FAMILY MEDICINE

## 2025-01-14 PROCEDURE — 99214 OFFICE O/P EST MOD 30 MIN: CPT | Performed by: FAMILY MEDICINE

## 2025-01-14 PROCEDURE — 1160F RVW MEDS BY RX/DR IN RCRD: CPT | Performed by: FAMILY MEDICINE

## 2025-01-14 PROCEDURE — 3078F DIAST BP <80 MM HG: CPT | Performed by: FAMILY MEDICINE

## 2025-01-14 PROCEDURE — 82550 ASSAY OF CK (CPK): CPT

## 2025-01-14 PROCEDURE — G2211 COMPLEX E/M VISIT ADD ON: HCPCS | Performed by: FAMILY MEDICINE

## 2025-01-14 PROCEDURE — 1125F AMNT PAIN NOTED PAIN PRSNT: CPT | Performed by: FAMILY MEDICINE

## 2025-01-14 ASSESSMENT — PAIN SCALES - GENERAL: PAINLEVEL_OUTOF10: 3

## 2025-01-14 ASSESSMENT — ENCOUNTER SYMPTOMS
CHILLS: 0
BACK PAIN: 1
MYALGIAS: 1
FEVER: 0
DEPRESSION: 0
ARTHRALGIAS: 1

## 2025-01-14 NOTE — PROGRESS NOTES
Subjective   Patient ID: Mynor Blake is a 74 y.o. male who presents for Knee Pain (Pain in both knees.) and Shoulder Pain (Both shoulders. Right shoulder is worse than the left shoulder.).  HPI  74-year-old male with complaints of 4 to 6 weeks of progressive pain in both his knees, thighs, shoulders, arms, low back.  No fall or injury.  No other complaints.  He has been seeing orthopedics for his left knee.  Recently received a cortisone injection.  His muscles are also very sore in both his thighs and upper arms.  Review of Systems   Constitutional:  Negative for chills and fever.   Musculoskeletal:  Positive for arthralgias, back pain and myalgias.   All other systems reviewed and are negative.      Visit Vitals  /64 (BP Location: Right arm, Patient Position: Sitting, BP Cuff Size: Large adult)   Pulse 88        Objective   Physical Exam  Constitutional: Alert and in no acute distress. Well developed, well nourished.   Eyes: Pupils were equal in size, round, reactive to light (PERRL) with normal accommodation and extraocular movements intact (EOMI). Ophthalmoscopic examination: Normal.   Ears, Nose, Mouth, and Throat: External inspection of ears and nose: Normal. Otoscopic examination: Normal. Lips, teeth, and gums: Normal. Oropharynx: Normal.   Neck: No neck mass was observed. Supple. Thyroid not enlarged and there were no palpable thyroid nodules.   Cardiovascular: Heart rate and rhythm were normal, normal S1 and S2, no gallops, no murmurs and no pericardial rub. Carotid pulses: Normal with no bruits. Abdominal aorta: Normal. Pedal pulses: Normal. No peripheral edema.   Pulmonary: No respiratory distress. Clear bilateral breath sounds.   Abdomen: Soft nontender; no abdominal mass palpated. Normal bowel sounds. No organomegaly.   Skin: Normal skin color and pigmentation, normal skin turgor, and no rash.   Psychiatric: Judgment and insight: Intact. Mood and affect: Normal.  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  Possible statin induced myositis.  Hold rosuvastatin.  Creatinine kinase level drawn.  Patient will stay off his statin for 30 days to see if symptoms improve.

## 2025-01-15 LAB — CK SERPL-CCNC: 51 U/L (ref 0–325)

## 2025-01-21 ENCOUNTER — TELEPHONE (OUTPATIENT)
Dept: PHARMACY | Facility: HOSPITAL | Age: 75
End: 2025-01-21
Payer: MEDICARE

## 2025-01-21 NOTE — TELEPHONE ENCOUNTER
Population Health: Outreach by Ambulatory Pharmacy Team    Patient: Mynor PHILIP Blake  Primary Care Provider (PCP): Pancho Gay DO  Payor: Silverio QUISPE  Reason: Adherence  Medication(s): Olmesartan 40mg  Outcome: Left Riverail    Christine Yoder, PharmD  PGY-1 Pharmacy Resident

## 2025-01-22 NOTE — TELEPHONE ENCOUNTER
I reviewed the progress note and agree with the resident’s findings and plans as written. Case discussed with resident.    Hetal Montalvo, PharmD

## 2025-02-24 ENCOUNTER — APPOINTMENT (OUTPATIENT)
Dept: RHEUMATOLOGY | Facility: CLINIC | Age: 75
End: 2025-02-24
Payer: MEDICARE

## 2025-04-01 ENCOUNTER — APPOINTMENT (OUTPATIENT)
Dept: PRIMARY CARE | Facility: CLINIC | Age: 75
End: 2025-04-01
Payer: MEDICARE

## 2025-04-01 VITALS
SYSTOLIC BLOOD PRESSURE: 120 MMHG | TEMPERATURE: 97.7 F | OXYGEN SATURATION: 96 % | HEIGHT: 66 IN | DIASTOLIC BLOOD PRESSURE: 80 MMHG | HEART RATE: 69 BPM | BODY MASS INDEX: 41.78 KG/M2 | WEIGHT: 260 LBS

## 2025-04-01 DIAGNOSIS — J61 PULMONARY ASBESTOSIS (MULTI): ICD-10-CM

## 2025-04-01 DIAGNOSIS — R35.0 URINARY FREQUENCY: Primary | ICD-10-CM

## 2025-04-01 DIAGNOSIS — I25.10 CORONARY ARTERY DISEASE INVOLVING NATIVE CORONARY ARTERY OF NATIVE HEART WITHOUT ANGINA PECTORIS: ICD-10-CM

## 2025-04-01 DIAGNOSIS — I10 PRIMARY HYPERTENSION: ICD-10-CM

## 2025-04-01 DIAGNOSIS — N39.41 URGE INCONTINENCE: ICD-10-CM

## 2025-04-01 PROCEDURE — 3074F SYST BP LT 130 MM HG: CPT | Performed by: FAMILY MEDICINE

## 2025-04-01 PROCEDURE — 3008F BODY MASS INDEX DOCD: CPT | Performed by: FAMILY MEDICINE

## 2025-04-01 PROCEDURE — 1159F MED LIST DOCD IN RCRD: CPT | Performed by: FAMILY MEDICINE

## 2025-04-01 PROCEDURE — G2211 COMPLEX E/M VISIT ADD ON: HCPCS | Performed by: FAMILY MEDICINE

## 2025-04-01 PROCEDURE — 99214 OFFICE O/P EST MOD 30 MIN: CPT | Performed by: FAMILY MEDICINE

## 2025-04-01 PROCEDURE — 1126F AMNT PAIN NOTED NONE PRSNT: CPT | Performed by: FAMILY MEDICINE

## 2025-04-01 PROCEDURE — 3079F DIAST BP 80-89 MM HG: CPT | Performed by: FAMILY MEDICINE

## 2025-04-01 PROCEDURE — 1036F TOBACCO NON-USER: CPT | Performed by: FAMILY MEDICINE

## 2025-04-01 PROCEDURE — 1160F RVW MEDS BY RX/DR IN RCRD: CPT | Performed by: FAMILY MEDICINE

## 2025-04-01 PROCEDURE — 1157F ADVNC CARE PLAN IN RCRD: CPT | Performed by: FAMILY MEDICINE

## 2025-04-01 ASSESSMENT — ENCOUNTER SYMPTOMS
FEVER: 0
CHILLS: 0
FREQUENCY: 1

## 2025-04-01 ASSESSMENT — PATIENT HEALTH QUESTIONNAIRE - PHQ9
SUM OF ALL RESPONSES TO PHQ9 QUESTIONS 1 AND 2: 0
2. FEELING DOWN, DEPRESSED OR HOPELESS: NOT AT ALL
1. LITTLE INTEREST OR PLEASURE IN DOING THINGS: NOT AT ALL

## 2025-04-01 ASSESSMENT — PAIN SCALES - GENERAL: PAINLEVEL_OUTOF10: 0-NO PAIN

## 2025-04-01 NOTE — PROGRESS NOTES
Subjective   Patient ID: Mynor Blake is a 74 y.o. male who presents for Urinary Frequency (Patient is waking up at night for frequent urination. ).  HPI  74-year-old male with history of hypertension with concerns regarding urinary frequency and urge incontinence for 6 months.  Getting worse over the last 30 days.  No fever or chills.  No dysuria.  No hematuria.  Review of Systems   Constitutional:  Negative for chills and fever.   Genitourinary:  Positive for frequency and urgency.   All other systems reviewed and are negative.      Visit Vitals  /80 (BP Location: Left arm, Patient Position: Sitting, BP Cuff Size: Large adult)   Pulse 69   Temp 36.5 °C (97.7 °F) (Temporal)        Objective   Physical Exam  Constitutional: Alert and in no acute distress. Well developed, well nourished.   Eyes: Pupils were equal in size, round, reactive to light (PERRL) with normal accommodation and extraocular movements intact (EOMI). Ophthalmoscopic examination: Normal.   Ears, Nose, Mouth, and Throat: External inspection of ears and nose: Normal. Otoscopic examination: Normal. Lips, teeth, and gums: Normal. Oropharynx: Normal.   Neck: No neck mass was observed. Supple. Thyroid not enlarged and there were no palpable thyroid nodules.   Cardiovascular: Heart rate and rhythm were normal, normal S1 and S2, no gallops, no murmurs and no pericardial rub. Carotid pulses: Normal with no bruits. Abdominal aorta: Normal. Pedal pulses: Normal. No peripheral edema.   Pulmonary: No respiratory distress. Clear bilateral breath sounds.   Abdomen: Soft nontender; no abdominal mass palpated. Normal bowel sounds. No organomegaly.   Skin: Normal skin color and pigmentation, normal skin turgor, and no rash.   Psychiatric: Judgment and insight: Intact. Mood and affect: Normal.  Assessment/Plan   Problem List Items Addressed This Visit             ICD-10-CM    Coronary artery disease I25.10    Hypertension I10    Relevant Orders    Renal  function panel    Pulmonary asbestosis (Multi) J61    Urinary frequency - Primary R35.0    Relevant Orders    Urine Culture    Urinalysis with Reflex Microscopic    PSA    Renal function panel    Urge incontinence N39.41    Relevant Orders    Urine Culture    Urinalysis with Reflex Microscopic    PSA

## 2025-04-03 LAB
ALBUMIN SERPL-MCNC: 4.5 G/DL (ref 3.6–5.1)
APPEARANCE UR: CLEAR
BACTERIA #/AREA URNS HPF: ABNORMAL /HPF
BACTERIA UR CULT: NORMAL
BILIRUB UR QL STRIP: NEGATIVE
BUN SERPL-MCNC: 22 MG/DL (ref 7–25)
BUN/CREAT SERPL: NORMAL (CALC) (ref 6–22)
CALCIUM SERPL-MCNC: 9.7 MG/DL (ref 8.6–10.3)
CHLORIDE SERPL-SCNC: 105 MMOL/L (ref 98–110)
CO2 SERPL-SCNC: 23 MMOL/L (ref 20–32)
COLOR UR: YELLOW
CREAT SERPL-MCNC: 1.16 MG/DL (ref 0.7–1.28)
EGFRCR SERPLBLD CKD-EPI 2021: 67 ML/MIN/1.73M2
GLUCOSE SERPL-MCNC: 89 MG/DL (ref 65–99)
GLUCOSE UR QL STRIP: NEGATIVE
HGB UR QL STRIP: NEGATIVE
HYALINE CASTS #/AREA URNS LPF: ABNORMAL /LPF
KETONES UR QL STRIP: NEGATIVE
LEUKOCYTE ESTERASE UR QL STRIP: NEGATIVE
NITRITE UR QL STRIP: NEGATIVE
PH UR STRIP: ABNORMAL [PH] (ref 5–8)
PHOSPHATE SERPL-MCNC: 3 MG/DL (ref 2.1–4.3)
POTASSIUM SERPL-SCNC: 4.6 MMOL/L (ref 3.5–5.3)
PROT UR QL STRIP: ABNORMAL
PSA SERPL-MCNC: 0.5 NG/ML
RBC #/AREA URNS HPF: ABNORMAL /HPF
SERVICE CMNT-IMP: ABNORMAL
SODIUM SERPL-SCNC: 139 MMOL/L (ref 135–146)
SP GR UR STRIP: 1.02 (ref 1–1.03)
SQUAMOUS #/AREA URNS HPF: ABNORMAL /HPF
WBC #/AREA URNS HPF: ABNORMAL /HPF

## 2025-04-04 DIAGNOSIS — M1A.9XX1 CHRONIC GOUT, UNSPECIFIED, WITH TOPHUS (TOPHI): ICD-10-CM

## 2025-04-07 RX ORDER — ALLOPURINOL 300 MG/1
300 TABLET ORAL DAILY
Qty: 90 TABLET | Refills: 1 | OUTPATIENT
Start: 2025-04-07

## 2025-04-07 RX ORDER — ALLOPURINOL 100 MG/1
200 TABLET ORAL DAILY
Qty: 180 TABLET | Refills: 1 | OUTPATIENT
Start: 2025-04-07

## 2025-04-08 ENCOUNTER — OFFICE VISIT (OUTPATIENT)
Dept: ORTHOPEDIC SURGERY | Facility: CLINIC | Age: 75
End: 2025-04-08
Payer: MEDICARE

## 2025-04-08 DIAGNOSIS — M17.0 ARTHRITIS OF BOTH KNEES: ICD-10-CM

## 2025-04-08 PROCEDURE — 1157F ADVNC CARE PLAN IN RCRD: CPT | Performed by: ORTHOPAEDIC SURGERY

## 2025-04-08 PROCEDURE — 1160F RVW MEDS BY RX/DR IN RCRD: CPT | Performed by: ORTHOPAEDIC SURGERY

## 2025-04-08 PROCEDURE — 1159F MED LIST DOCD IN RCRD: CPT | Performed by: ORTHOPAEDIC SURGERY

## 2025-04-08 PROCEDURE — 99213 OFFICE O/P EST LOW 20 MIN: CPT | Performed by: ORTHOPAEDIC SURGERY

## 2025-04-08 PROCEDURE — 1036F TOBACCO NON-USER: CPT | Performed by: ORTHOPAEDIC SURGERY

## 2025-04-09 NOTE — PROGRESS NOTES
74-year-old is seen with left knee pain.  He has been having persistent moderate throbbing pain in the left knee.  Pain is worse with standing and walking and going up and down stairs and getting up and down from a chair and in and out of a car.    Pleasant and no acute distress. Walks with antalgic gait. Stands with varus alignment of the left knee and neutral on the right. Left knee range of motion is 5-110°. The knee is stable to varus and valgus stress Lachman and posterior drawer. There is a mild effusion. There is generalized tenderness. Right knee range of motion is 0-120°. There is no effusion. The knee is stable to varus and valgus stress Lachman and posterior drawer. Both lower extremities are well perfused the skin is intact and muscle tone is adequate.    Multiple x-ray views of the left knee are personally reviewed and there is moderate degenerative changes.    A detailed discussion about knee arthritis was done.  Treatment options were reviewed.  He will use Tylenol and avoid aggravating activities.  He will perform physical therapy.  He can follow-up based on his symptoms.  He may benefit from intermittent injections.

## 2025-05-23 ENCOUNTER — OFFICE VISIT (OUTPATIENT)
Dept: CARDIOLOGY | Facility: CLINIC | Age: 75
End: 2025-05-23
Payer: MEDICARE

## 2025-05-23 VITALS
WEIGHT: 259 LBS | DIASTOLIC BLOOD PRESSURE: 80 MMHG | HEART RATE: 78 BPM | BODY MASS INDEX: 41.62 KG/M2 | SYSTOLIC BLOOD PRESSURE: 130 MMHG | HEIGHT: 66 IN | OXYGEN SATURATION: 96 %

## 2025-05-23 DIAGNOSIS — E78.2 MIXED HYPERLIPIDEMIA: ICD-10-CM

## 2025-05-23 DIAGNOSIS — I10 PRIMARY HYPERTENSION: ICD-10-CM

## 2025-05-23 DIAGNOSIS — Z95.1 H/O FOUR VESSEL CORONARY ARTERY BYPASS GRAFT: ICD-10-CM

## 2025-05-23 DIAGNOSIS — I25.119 CORONARY ARTERY DISEASE INVOLVING NATIVE CORONARY ARTERY OF NATIVE HEART WITH ANGINA PECTORIS: ICD-10-CM

## 2025-05-23 DIAGNOSIS — R09.89 RIGHT CAROTID BRUIT: ICD-10-CM

## 2025-05-23 DIAGNOSIS — I35.0 NONRHEUMATIC AORTIC VALVE STENOSIS: Primary | ICD-10-CM

## 2025-05-23 DIAGNOSIS — R07.89 CHEST TIGHTNESS: ICD-10-CM

## 2025-05-23 DIAGNOSIS — Z95.2 S/P AORTIC VALVE REPLACEMENT: ICD-10-CM

## 2025-05-23 PROBLEM — I25.10 CORONARY ARTERY DISEASE: Status: RESOLVED | Noted: 2023-07-31 | Resolved: 2025-05-23

## 2025-05-23 PROBLEM — I70.213 INTERMITTENT CLAUDICATION OF BOTH LOWER EXTREMITIES DUE TO ATHEROSCLEROSIS: Status: RESOLVED | Noted: 2023-07-31 | Resolved: 2025-05-23

## 2025-05-23 PROCEDURE — 99214 OFFICE O/P EST MOD 30 MIN: CPT | Performed by: INTERNAL MEDICINE

## 2025-05-23 PROCEDURE — 99212 OFFICE O/P EST SF 10 MIN: CPT | Performed by: INTERNAL MEDICINE

## 2025-05-23 PROCEDURE — 3079F DIAST BP 80-89 MM HG: CPT | Performed by: INTERNAL MEDICINE

## 2025-05-23 PROCEDURE — 3008F BODY MASS INDEX DOCD: CPT | Performed by: INTERNAL MEDICINE

## 2025-05-23 PROCEDURE — 1036F TOBACCO NON-USER: CPT | Performed by: INTERNAL MEDICINE

## 2025-05-23 PROCEDURE — 3075F SYST BP GE 130 - 139MM HG: CPT | Performed by: INTERNAL MEDICINE

## 2025-05-23 PROCEDURE — 1159F MED LIST DOCD IN RCRD: CPT | Performed by: INTERNAL MEDICINE

## 2025-05-23 RX ORDER — AMINOPHYLLINE 25 MG/ML
125 INJECTION, SOLUTION INTRAVENOUS ONCE AS NEEDED
OUTPATIENT
Start: 2025-05-23

## 2025-05-23 RX ORDER — NITROGLYCERIN 0.4 MG/1
0.4 TABLET SUBLINGUAL EVERY 5 MIN PRN
Qty: 25 TABLET | Refills: 1 | Status: SHIPPED | OUTPATIENT
Start: 2025-05-23 | End: 2026-05-23

## 2025-05-23 RX ORDER — REGADENOSON 0.08 MG/ML
0.4 INJECTION, SOLUTION INTRAVENOUS
OUTPATIENT
Start: 2025-05-23

## 2025-05-23 ASSESSMENT — ENCOUNTER SYMPTOMS: DYSPNEA ON EXERTION: 1

## 2025-05-23 NOTE — PROGRESS NOTES
Subjective   Mynor Blake is a 74 y.o. male.    Chief Complaint:  Follow-up coronary disease, aortic valve replacement.  Chest tightness.  Blurred vision.    HPI    Over the past several weeks he has noted chest tightness.  This is in the midsternal area across the anterior chest.  He describes it as a pressure-like sensation across the anterior chest generally related to exertion but on rare occasions occurring at rest.  Also has noted more exertional dyspnea.  Has had episodes of transient blurred vision.  Has not seen an ophthalmologist.  Has a number of orthopedic issues including gout and degenerative arthritis.    The patient underwent an echocardiographic study in 2018 which demonstrated severe aortic stenosis with a peak gradient of over 100 mmHg and a mean gradient of 60 mmHg. He underwent cardiac catheterization, which demonstrated patent bypass grafts with a left internal mammary graft to the anterior descending, a free right internal mammary graft to the circumflex and a patent saphenous vein graft to the posterolateral branch. He has normal left ventricular function. His coronary artery bypass grafting procedure was performed in November 2010.     The patient underwent aortic valve replacement on April 19, 2018 with implantation of a #25 freestyle bioprosthetic aortic valve prosthesis.       Allergies  Medication    · No Known Drug Allergies     Family History  Mother    · Family history of cardiac disorder (V17.49) (Z82.49)  Father    · Family history of diabetes mellitus (V18.0) (Z83.3)     Social History  Problems    · Does not use illicit drugs (V49.89) (Z78.9)   · Never a smoker   · Occasional alcohol use    Review of Systems   Constitutional: Positive for malaise/fatigue.   Cardiovascular:  Positive for chest pain and dyspnea on exertion.   Musculoskeletal:  Positive for arthritis, gout and joint pain.       Current Medications[1]     Visit Vitals  /80 (BP Location: Left arm)   Pulse 78  "  Ht 1.676 m (5' 6\")   Wt 117 kg (259 lb)   SpO2 96%   BMI 41.80 kg/m²   Smoking Status Never   BSA 2.33 m²        Objective     Constitutional:       Appearance: Not in distress.   Neck:      Vascular: JVD normal.   Pulmonary:      Breath sounds: Normal breath sounds.   Cardiovascular:      Normal rate. Regular rhythm. S1 with normal intensity. S2 with normal intensity.       Murmurs: There is a grade 2/6 systolic murmur.      No gallop.    Pulses:     Intact distal pulses.   Edema:     Peripheral edema absent.   Abdominal:      General: Bowel sounds are normal.   Neurological:      Mental Status: Alert and oriented to person, place and time.         Lab Review:   Lab Results   Component Value Date     04/01/2025    K 4.6 04/01/2025     04/01/2025    CO2 23 04/01/2025    BUN 22 04/01/2025    CREATININE 1.16 04/01/2025    GLUCOSE 89 04/01/2025    CALCIUM 9.7 04/01/2025     Lab Results   Component Value Date    CHOL 112 08/01/2024    TRIG 134 08/01/2024    HDL 37.7 08/01/2024       Assessment:    1.  Coronary artery disease.  Status post coronary bypass grafting in 2010.  Does have 2 arterial grafts and a vein graft to the posterolateral branch.  Given his symptoms we will proceed with a nuclear stress imaging study.    2.  Aortic stenosis.  Status post aortic valve replacement.  Aortic valve is functioning normally.    3.  Transient neurologic symptoms.  Also has a soft carotid bruit.  Will get a carotid ultrasound study.    4.  Hypertension.  Initial blood pressure is elevated, repeat blood pressure is normal.    5.  Hyperlipidemia.  Latest LDL is 48.         [1]   Current Outpatient Medications   Medication Sig Dispense Refill    allopurinol (Zyloprim) 100 mg tablet TAKE 2 TABLETS BY MOUTH ONCE DAILY. 180 tablet 1    allopurinol (Zyloprim) 300 mg tablet TAKE 1 TABLET (300 MG) BY MOUTH ONCE DAILY. AS DIRECTED 90 tablet 1    colchicine 0.6 mg tablet TAKE 1 TABLET (0.6 MG) BY MOUTH EVERY OTHER DAY. 45 " tablet 1    olmesartan (BENIcar) 40 mg tablet TAKE 1 TABLET BY MOUTH EVERY DAY 90 tablet 3    rosuvastatin (Crestor) 40 mg tablet TAKE 1 TABLET BY MOUTH EVERY DAY 90 tablet 3    nitroglycerin (Nitrostat) 0.4 mg SL tablet Place 1 tablet (0.4 mg) under the tongue every 5 minutes if needed for chest pain. May repeat dose every 5 minutes for up to 3 doses total. 25 tablet 1     No current facility-administered medications for this visit.

## 2025-06-03 PROBLEM — J06.9 UPPER RESPIRATORY INFECTION, ACUTE: Status: RESOLVED | Noted: 2023-07-31 | Resolved: 2025-06-03

## 2025-06-21 DIAGNOSIS — M1A.9XX1 CHRONIC GOUT, UNSPECIFIED, WITH TOPHUS (TOPHI): ICD-10-CM

## 2025-06-23 ENCOUNTER — APPOINTMENT (OUTPATIENT)
Dept: CARDIOLOGY | Facility: HOSPITAL | Age: 75
End: 2025-06-23
Payer: MEDICARE

## 2025-06-23 ENCOUNTER — HOSPITAL ENCOUNTER (OUTPATIENT)
Dept: RADIOLOGY | Facility: HOSPITAL | Age: 75
Discharge: HOME | End: 2025-06-23
Payer: MEDICARE

## 2025-06-23 ENCOUNTER — APPOINTMENT (OUTPATIENT)
Dept: RADIOLOGY | Facility: HOSPITAL | Age: 75
End: 2025-06-23
Payer: MEDICARE

## 2025-06-23 ENCOUNTER — HOSPITAL ENCOUNTER (OUTPATIENT)
Dept: CARDIOLOGY | Facility: HOSPITAL | Age: 75
Discharge: HOME | End: 2025-06-23
Payer: MEDICARE

## 2025-06-23 DIAGNOSIS — R07.9 CHEST PAIN, UNSPECIFIED: ICD-10-CM

## 2025-06-23 DIAGNOSIS — I25.119 CORONARY ARTERY DISEASE INVOLVING NATIVE CORONARY ARTERY OF NATIVE HEART WITH ANGINA PECTORIS: ICD-10-CM

## 2025-06-23 DIAGNOSIS — R07.89 CHEST TIGHTNESS: ICD-10-CM

## 2025-06-23 PROCEDURE — 93016 CV STRESS TEST SUPVJ ONLY: CPT | Performed by: INTERNAL MEDICINE

## 2025-06-23 PROCEDURE — 3430000001 HC RX 343 DIAGNOSTIC RADIOPHARMACEUTICALS: Performed by: INTERNAL MEDICINE

## 2025-06-23 PROCEDURE — 78452 HT MUSCLE IMAGE SPECT MULT: CPT

## 2025-06-23 PROCEDURE — A9502 TC99M TETROFOSMIN: HCPCS | Performed by: INTERNAL MEDICINE

## 2025-06-23 PROCEDURE — 2500000004 HC RX 250 GENERAL PHARMACY W/ HCPCS (ALT 636 FOR OP/ED): Performed by: INTERNAL MEDICINE

## 2025-06-23 PROCEDURE — 93018 CV STRESS TEST I&R ONLY: CPT | Performed by: INTERNAL MEDICINE

## 2025-06-23 PROCEDURE — 78452 HT MUSCLE IMAGE SPECT MULT: CPT | Performed by: INTERNAL MEDICINE

## 2025-06-23 PROCEDURE — 93017 CV STRESS TEST TRACING ONLY: CPT

## 2025-06-23 RX ORDER — COLCHICINE 0.6 MG/1
TABLET ORAL
Qty: 45 TABLET | Refills: 1 | OUTPATIENT
Start: 2025-06-23

## 2025-06-23 RX ORDER — REGADENOSON 0.08 MG/ML
0.4 INJECTION, SOLUTION INTRAVENOUS
Status: COMPLETED | OUTPATIENT
Start: 2025-06-23 | End: 2025-06-23

## 2025-06-23 RX ADMIN — TETROFOSMIN 35.2 MILLICURIE: 0.23 INJECTION, POWDER, LYOPHILIZED, FOR SOLUTION INTRAVENOUS at 11:40

## 2025-06-23 RX ADMIN — REGADENOSON 0.4 MG: 0.08 INJECTION, SOLUTION INTRAVENOUS at 11:55

## 2025-06-23 RX ADMIN — TETROFOSMIN 9.6 MILLICURIE: 0.23 INJECTION, POWDER, LYOPHILIZED, FOR SOLUTION INTRAVENOUS at 10:30

## 2025-06-24 ENCOUNTER — APPOINTMENT (OUTPATIENT)
Dept: RADIOLOGY | Facility: HOSPITAL | Age: 75
End: 2025-06-24
Payer: MEDICARE

## 2025-06-25 ENCOUNTER — APPOINTMENT (OUTPATIENT)
Dept: VASCULAR MEDICINE | Facility: CLINIC | Age: 75
End: 2025-06-25
Payer: MEDICARE

## 2025-06-25 ENCOUNTER — OFFICE VISIT (OUTPATIENT)
Dept: CARDIOLOGY | Facility: CLINIC | Age: 75
End: 2025-06-25
Payer: MEDICARE

## 2025-06-25 ENCOUNTER — HOSPITAL ENCOUNTER (OUTPATIENT)
Dept: VASCULAR MEDICINE | Facility: CLINIC | Age: 75
Discharge: HOME | End: 2025-06-25
Payer: MEDICARE

## 2025-06-25 VITALS
WEIGHT: 259 LBS | BODY MASS INDEX: 38.36 KG/M2 | OXYGEN SATURATION: 94 % | SYSTOLIC BLOOD PRESSURE: 140 MMHG | DIASTOLIC BLOOD PRESSURE: 90 MMHG | HEIGHT: 69 IN | HEART RATE: 90 BPM

## 2025-06-25 DIAGNOSIS — Z95.1 H/O FOUR VESSEL CORONARY ARTERY BYPASS GRAFT: ICD-10-CM

## 2025-06-25 DIAGNOSIS — Z95.2 S/P AORTIC VALVE REPLACEMENT: Primary | ICD-10-CM

## 2025-06-25 DIAGNOSIS — I35.0 NONRHEUMATIC AORTIC VALVE STENOSIS: ICD-10-CM

## 2025-06-25 DIAGNOSIS — R07.89 CHEST TIGHTNESS: ICD-10-CM

## 2025-06-25 DIAGNOSIS — I25.119 CORONARY ARTERY DISEASE INVOLVING NATIVE CORONARY ARTERY OF NATIVE HEART WITH ANGINA PECTORIS: ICD-10-CM

## 2025-06-25 DIAGNOSIS — E78.2 MIXED HYPERLIPIDEMIA: ICD-10-CM

## 2025-06-25 DIAGNOSIS — R09.89 RIGHT CAROTID BRUIT: ICD-10-CM

## 2025-06-25 DIAGNOSIS — I10 PRIMARY HYPERTENSION: ICD-10-CM

## 2025-06-25 LAB — BODY SURFACE AREA: 2.39 M2

## 2025-06-25 PROCEDURE — 3008F BODY MASS INDEX DOCD: CPT | Performed by: INTERNAL MEDICINE

## 2025-06-25 PROCEDURE — 99212 OFFICE O/P EST SF 10 MIN: CPT | Mod: 25

## 2025-06-25 PROCEDURE — 93880 EXTRACRANIAL BILAT STUDY: CPT

## 2025-06-25 PROCEDURE — 3080F DIAST BP >= 90 MM HG: CPT | Performed by: INTERNAL MEDICINE

## 2025-06-25 PROCEDURE — 99213 OFFICE O/P EST LOW 20 MIN: CPT | Performed by: INTERNAL MEDICINE

## 2025-06-25 PROCEDURE — 1159F MED LIST DOCD IN RCRD: CPT | Performed by: INTERNAL MEDICINE

## 2025-06-25 PROCEDURE — 3077F SYST BP >= 140 MM HG: CPT | Performed by: INTERNAL MEDICINE

## 2025-06-25 PROCEDURE — 93880 EXTRACRANIAL BILAT STUDY: CPT | Performed by: SURGERY

## 2025-06-25 NOTE — PATIENT INSTRUCTIONS
Your carotid studies are normal.    Your stress test and heart scans are normal.    No restrictions on activities.

## 2025-06-25 NOTE — PROGRESS NOTES
"Subjective   Mynor Blake is a 74 y.o. male.    Chief Complaint:  Follow-up nuclear stress imaging study.  Follow-up carotid study.    HPI    On his last visit he presented to us with symptoms of chest tightness.  This was described as a midsternal chest pressure sensation across the anterior chest usually related to exertion but on rare occasions at rest.  Also has had transient blurred vision.  We elected to proceed with a nuclear stress imaging study given the fact that he has significant coronary disease and we also ordered a carotid study.    The patient underwent an echocardiographic study in 2018 which demonstrated severe aortic stenosis with a peak gradient of over 100 mmHg and a mean gradient of 60 mmHg. He underwent cardiac catheterization, which demonstrated patent bypass grafts with a left internal mammary graft to the anterior descending, a free right internal mammary graft to the circumflex and a patent saphenous vein graft to the posterolateral branch. He has normal left ventricular function. His coronary artery bypass grafting procedure was performed in November 2010.     The patient underwent aortic valve replacement on April 19, 2018 with implantation of a #25 freestyle bioprosthetic aortic valve prosthesis.       Allergies  Medication    · No Known Drug Allergies     Family History  Mother    · Family history of cardiac disorder (V17.49) (Z82.49)  Father    · Family history of diabetes mellitus (V18.0) (Z83.3)     Social History  Problems    · Does not use illicit drugs (V49.89) (Z78.9)   · Never a smoker   · Occasional alcohol use     Review of Systems   Constitutional: Positive for malaise/fatigue.   Cardiovascular:  Positive for chest pain and dyspnea on exertion.   Musculoskeletal:  Positive for arthritis, gout and joint pain.       Current Medications[1]     Visit Vitals  /90 (BP Location: Left arm, Patient Position: Sitting, BP Cuff Size: Adult)   Pulse 90   Ht 1.753 m (5' 9\")   Wt " 117 kg (259 lb)   SpO2 94%   BMI 38.25 kg/m²   Smoking Status Never   BSA 2.39 m²        Objective     Constitutional:       Appearance: Not in distress.   Neck:      Vascular: Carotid bruit present. JVD normal.   Pulmonary:      Breath sounds: Normal breath sounds.   Cardiovascular:      Normal rate. Regular rhythm. S1 with normal intensity. S2 with normal intensity.       Murmurs: There is a grade 2/6 systolic murmur.      No gallop.    Pulses:     Intact distal pulses.   Edema:     Peripheral edema absent.   Abdominal:      General: Bowel sounds are normal.   Neurological:      Mental Status: Alert and oriented to person, place and time.         Assessment:    1.  Coronary disease.  The patient's stress thallium study performed on June 23, 2025 showed no ischemia with a normal left ventricular ejection fraction.  Unclear etiology of his chest pain symptoms.  However they are probably noncardiac.  Continue medical management with no restrictions on activities.    2.  Hypertension.  Hypertensive today but has not taken his medications and he is very anxious today about the results.  Had some very bad dreams over the results.    3.  Hyperlipidemia.  Latest LDL is 48.    4.  Status post aortic valve replacement.  By exam the aortic valve is functioning normally.    5.  Transient neurologic symptoms.  Patient's carotid studies are normal.    Discussed with patient in detail all of our findings.  Continue medical management.         [1]   Current Outpatient Medications   Medication Sig Dispense Refill    allopurinol (Zyloprim) 100 mg tablet TAKE 2 TABLETS BY MOUTH ONCE DAILY. 180 tablet 1    allopurinol (Zyloprim) 300 mg tablet TAKE 1 TABLET (300 MG) BY MOUTH ONCE DAILY. AS DIRECTED 90 tablet 1    colchicine 0.6 mg tablet TAKE 1 TABLET (0.6 MG) BY MOUTH EVERY OTHER DAY. 45 tablet 1    nitroglycerin (Nitrostat) 0.4 mg SL tablet Place 1 tablet (0.4 mg) under the tongue every 5 minutes if needed for chest pain. May repeat  dose every 5 minutes for up to 3 doses total. 25 tablet 1    olmesartan (BENIcar) 40 mg tablet TAKE 1 TABLET BY MOUTH EVERY DAY 90 tablet 3    rosuvastatin (Crestor) 40 mg tablet TAKE 1 TABLET BY MOUTH EVERY DAY 90 tablet 3     No current facility-administered medications for this visit.

## 2025-08-05 ENCOUNTER — APPOINTMENT (OUTPATIENT)
Dept: PRIMARY CARE | Facility: CLINIC | Age: 75
End: 2025-08-05
Payer: MEDICARE

## 2025-08-14 ENCOUNTER — APPOINTMENT (OUTPATIENT)
Dept: CARDIOLOGY | Facility: CLINIC | Age: 75
End: 2025-08-14
Payer: MEDICARE

## 2025-08-19 ENCOUNTER — OFFICE VISIT (OUTPATIENT)
Dept: PRIMARY CARE | Facility: CLINIC | Age: 75
End: 2025-08-19
Payer: MEDICARE

## 2025-08-19 VITALS
TEMPERATURE: 98.2 F | HEART RATE: 70 BPM | BODY MASS INDEX: 39.1 KG/M2 | SYSTOLIC BLOOD PRESSURE: 148 MMHG | OXYGEN SATURATION: 91 % | DIASTOLIC BLOOD PRESSURE: 88 MMHG | WEIGHT: 264.8 LBS

## 2025-08-19 DIAGNOSIS — Z95.2 S/P AORTIC VALVE REPLACEMENT: ICD-10-CM

## 2025-08-19 DIAGNOSIS — J61 PULMONARY ASBESTOSIS (MULTI): ICD-10-CM

## 2025-08-19 DIAGNOSIS — I10 PRIMARY HYPERTENSION: ICD-10-CM

## 2025-08-19 DIAGNOSIS — R42 VERTIGO: ICD-10-CM

## 2025-08-19 DIAGNOSIS — R06.02 SHORTNESS OF BREATH: Primary | ICD-10-CM

## 2025-08-19 PROCEDURE — 1123F ACP DISCUSS/DSCN MKR DOCD: CPT | Performed by: FAMILY MEDICINE

## 2025-08-19 PROCEDURE — 1036F TOBACCO NON-USER: CPT | Performed by: FAMILY MEDICINE

## 2025-08-19 PROCEDURE — 99214 OFFICE O/P EST MOD 30 MIN: CPT | Performed by: FAMILY MEDICINE

## 2025-08-19 PROCEDURE — 1159F MED LIST DOCD IN RCRD: CPT | Performed by: FAMILY MEDICINE

## 2025-08-19 PROCEDURE — 1126F AMNT PAIN NOTED NONE PRSNT: CPT | Performed by: FAMILY MEDICINE

## 2025-08-19 PROCEDURE — 3079F DIAST BP 80-89 MM HG: CPT | Performed by: FAMILY MEDICINE

## 2025-08-19 PROCEDURE — 1160F RVW MEDS BY RX/DR IN RCRD: CPT | Performed by: FAMILY MEDICINE

## 2025-08-19 PROCEDURE — 93000 ELECTROCARDIOGRAM COMPLETE: CPT | Performed by: FAMILY MEDICINE

## 2025-08-19 PROCEDURE — 1158F ADVNC CARE PLAN TLK DOCD: CPT | Performed by: FAMILY MEDICINE

## 2025-08-19 PROCEDURE — 3077F SYST BP >= 140 MM HG: CPT | Performed by: FAMILY MEDICINE

## 2025-08-19 RX ORDER — PREDNISONE 5 MG/1
5 TABLET ORAL 2 TIMES DAILY
Qty: 14 TABLET | Refills: 0 | Status: SHIPPED | OUTPATIENT
Start: 2025-08-19 | End: 2025-08-26

## 2025-08-19 ASSESSMENT — ENCOUNTER SYMPTOMS
FEVER: 0
HEADACHES: 0
COUGH: 0
DEPRESSION: 0
CHEST TIGHTNESS: 0
CHILLS: 0
SHORTNESS OF BREATH: 1
DIZZINESS: 1

## 2025-08-19 ASSESSMENT — PAIN SCALES - GENERAL: PAINLEVEL_OUTOF10: 0-NO PAIN

## 2025-08-19 ASSESSMENT — PATIENT HEALTH QUESTIONNAIRE - PHQ9
2. FEELING DOWN, DEPRESSED OR HOPELESS: NOT AT ALL
1. LITTLE INTEREST OR PLEASURE IN DOING THINGS: NOT AT ALL
SUM OF ALL RESPONSES TO PHQ9 QUESTIONS 1 AND 2: 0

## 2025-08-22 LAB — NONINV COLON CA DNA+OCC BLD SCRN STL QL: NEGATIVE

## 2025-08-28 ENCOUNTER — TELEPHONE (OUTPATIENT)
Dept: PHARMACY | Facility: HOSPITAL | Age: 75
End: 2025-08-28
Payer: MEDICARE

## 2025-09-18 ENCOUNTER — APPOINTMENT (OUTPATIENT)
Dept: PRIMARY CARE | Facility: CLINIC | Age: 75
End: 2025-09-18
Payer: MEDICARE